# Patient Record
Sex: MALE | Race: WHITE | Employment: FULL TIME | URBAN - NONMETROPOLITAN AREA
[De-identification: names, ages, dates, MRNs, and addresses within clinical notes are randomized per-mention and may not be internally consistent; named-entity substitution may affect disease eponyms.]

---

## 2017-01-11 ENCOUNTER — TELEPHONE (OUTPATIENT)
Dept: FAMILY MEDICINE CLINIC | Facility: CLINIC | Age: 52
End: 2017-01-11

## 2017-01-11 NOTE — TELEPHONE ENCOUNTER
MICHAELA HAS BEEN SICK WITH WHAT COULD BE THE [place] VIRUS, HE HAS BEEN OUT ALL WEEK SO FAR, POSSIBLY RETURNING THURSDAY, GETACHEW WAS WONDERING IF HE COULD GET A NOTE FOR TOMORROW IF HE STAYS HOME, WOULD  HAVE TO SEE HIM?     I'm assuming they are referring to

## 2017-01-12 ENCOUNTER — OFFICE VISIT (OUTPATIENT)
Dept: FAMILY MEDICINE CLINIC | Facility: CLINIC | Age: 52
End: 2017-01-12

## 2017-01-12 VITALS
BODY MASS INDEX: 32 KG/M2 | OXYGEN SATURATION: 96 % | TEMPERATURE: 98 F | SYSTOLIC BLOOD PRESSURE: 110 MMHG | DIASTOLIC BLOOD PRESSURE: 88 MMHG | HEART RATE: 88 BPM | WEIGHT: 246.13 LBS

## 2017-01-12 DIAGNOSIS — K52.9 GASTROENTERITIS: Primary | ICD-10-CM

## 2017-01-12 PROCEDURE — 99213 OFFICE O/P EST LOW 20 MIN: CPT | Performed by: FAMILY MEDICINE

## 2017-01-12 NOTE — TELEPHONE ENCOUNTER
Future Appointments  Date Time Provider Nadia Barroso   1/12/2017 11:30 AM Lory Steven DO [facility] [place]     Patient still has appointment for this morning

## 2017-01-12 NOTE — PROGRESS NOTES
Blessing Syed is a 45year old male. Patient presents with: Other: \"noro virus\" 01/08/17-01/03/17. .. Guadalupe Riedel needs note to go back to work. .... Guadalupe Riedel room 1      HPI:   Patient developed vomiting and diarrhea in the evening August 8.   Continued on the ninth and 10 week       Comment: socially       REVIEW OF SYSTEMS:   GENERAL HEALTH: feels well otherwise  SKIN: denies any unusual skin lesions or rashes  NEURO: denies headaches    EXAM:   /88 mmHg  Pulse 88  Temp(Src) 98 °F (36.7 °C) (Tympanic)  Wt 246 lb 2 oz

## 2017-01-13 RX ORDER — SIMVASTATIN 40 MG
TABLET ORAL
Qty: 30 TABLET | Refills: 0 | Status: SHIPPED | OUTPATIENT
Start: 2017-01-13 | End: 2017-01-20

## 2017-01-20 RX ORDER — SIMVASTATIN 40 MG
TABLET ORAL
Qty: 30 TABLET | Refills: 4 | Status: SHIPPED | OUTPATIENT
Start: 2017-01-20 | End: 2017-07-18

## 2017-01-28 ENCOUNTER — TELEPHONE (OUTPATIENT)
Dept: FAMILY MEDICINE CLINIC | Facility: CLINIC | Age: 52
End: 2017-01-28

## 2017-01-28 NOTE — TELEPHONE ENCOUNTER
Travis states that pt woke up around midnight c/o very sore throat and chest congestion. Jazmyn Stroud that there were no available appointments with a covering provider and recommended WIC.  Travis verbalized understanding of the information provided

## 2017-01-30 ENCOUNTER — OFFICE VISIT (OUTPATIENT)
Dept: FAMILY MEDICINE CLINIC | Facility: CLINIC | Age: 52
End: 2017-01-30

## 2017-01-30 VITALS
SYSTOLIC BLOOD PRESSURE: 136 MMHG | WEIGHT: 250.5 LBS | DIASTOLIC BLOOD PRESSURE: 84 MMHG | TEMPERATURE: 98 F | OXYGEN SATURATION: 96 % | HEART RATE: 97 BPM | BODY MASS INDEX: 33 KG/M2

## 2017-01-30 DIAGNOSIS — J01.90 ACUTE NON-RECURRENT SINUSITIS, UNSPECIFIED LOCATION: Primary | ICD-10-CM

## 2017-01-30 PROCEDURE — 99213 OFFICE O/P EST LOW 20 MIN: CPT | Performed by: FAMILY MEDICINE

## 2017-01-30 RX ORDER — AMOXICILLIN AND CLAVULANATE POTASSIUM 875; 125 MG/1; MG/1
1 TABLET, FILM COATED ORAL 2 TIMES DAILY
Qty: 20 TABLET | Refills: 0 | Status: SHIPPED | OUTPATIENT
Start: 2017-01-30 | End: 2018-02-01

## 2017-01-30 RX ORDER — PREDNISONE 20 MG/1
20 TABLET ORAL 2 TIMES DAILY
Qty: 14 TABLET | Refills: 0 | Status: SHIPPED | OUTPATIENT
Start: 2017-01-30 | End: 2017-02-06

## 2017-01-30 NOTE — PROGRESS NOTES
Shakila De La Rosa is a 47year old male. Patient presents with: Other: sinus issues--blood in mucus, ear pain, headache, sinus pain/pressure, cough, congestion, sore throat, fever. ...started on 1/26/17--fever and chills on 1/27/17. ...room 1      HPI:   Sin CARDIOVASCULAR: denies chest pain   GI: denies nausea, vomiting, diarrhea or abdominal pain   NEURO: denies headaches    EXAM:   /84 mmHg  Pulse 97  Temp(Src) 98.4 °F (36.9 °C) (Tympanic)  Wt 250 lb 8 oz  SpO2 96%  GENERAL: well developed, well nou Imaging & Consults:  None

## 2017-02-03 RX ORDER — CODEINE PHOSPHATE AND GUAIFENESIN 10; 100 MG/5ML; MG/5ML
SOLUTION ORAL
Qty: 120 ML | Refills: 0 | Status: SHIPPED | OUTPATIENT
Start: 2017-02-03 | End: 2019-03-16 | Stop reason: ALTCHOICE

## 2017-02-03 RX ORDER — ALBUTEROL SULFATE 90 UG/1
2 AEROSOL, METERED RESPIRATORY (INHALATION) EVERY 6 HOURS PRN
Qty: 1 INHALER | Refills: 0 | Status: SHIPPED | OUTPATIENT
Start: 2017-02-03 | End: 2019-03-16 | Stop reason: ALTCHOICE

## 2017-03-30 ENCOUNTER — MED REC SCAN ONLY (OUTPATIENT)
Dept: FAMILY MEDICINE CLINIC | Facility: CLINIC | Age: 52
End: 2017-03-30

## 2017-07-18 RX ORDER — SIMVASTATIN 40 MG
TABLET ORAL
Qty: 90 TABLET | Refills: 1 | Status: SHIPPED | OUTPATIENT
Start: 2017-07-18 | End: 2018-01-13

## 2017-07-18 NOTE — TELEPHONE ENCOUNTER
From: Too Jauregui  Sent: 7/18/2017 10:00 AM CDT  Subject: Medication Renewal Request    Last Huizar.  [facility] would like a refill of the following medications:  simvastatin 40 MG Oral Tab Alisha Vicente, ]    Preferred pharmacy: [address] -

## 2018-01-13 ENCOUNTER — NURSE ONLY (OUTPATIENT)
Dept: FAMILY MEDICINE CLINIC | Facility: CLINIC | Age: 53
End: 2018-01-13

## 2018-01-13 DIAGNOSIS — Z12.5 SCREENING FOR PROSTATE CANCER: Primary | ICD-10-CM

## 2018-01-13 DIAGNOSIS — E78.00 PURE HYPERCHOLESTEROLEMIA: ICD-10-CM

## 2018-01-13 LAB
ALBUMIN SERPL-MCNC: 4.1 G/DL (ref 3.5–4.8)
ALP LIVER SERPL-CCNC: 72 U/L (ref 45–117)
ALT SERPL-CCNC: 41 U/L (ref 17–63)
AST SERPL-CCNC: 23 U/L (ref 15–41)
BILIRUB SERPL-MCNC: 0.7 MG/DL (ref 0.1–2)
BUN BLD-MCNC: 17 MG/DL (ref 8–20)
CALCIUM BLD-MCNC: 9.4 MG/DL (ref 8.3–10.3)
CHLORIDE: 105 MMOL/L (ref 101–111)
CHOLEST SMN-MCNC: 202 MG/DL (ref ?–200)
CO2: 29 MMOL/L (ref 22–32)
COMPLEXED PSA SERPL-MCNC: 4.23 NG/ML (ref 0.01–4)
CREAT BLD-MCNC: 1.24 MG/DL (ref 0.7–1.3)
GLUCOSE BLD-MCNC: 89 MG/DL (ref 70–99)
HDLC SERPL-MCNC: 44 MG/DL (ref 45–?)
HDLC SERPL: 4.59 {RATIO} (ref ?–4.97)
LDLC SERPL CALC-MCNC: 112 MG/DL (ref ?–130)
M PROTEIN MFR SERPL ELPH: 7.6 G/DL (ref 6.1–8.3)
NONHDLC SERPL-MCNC: 158 MG/DL (ref ?–130)
POTASSIUM SERPL-SCNC: 4.4 MMOL/L (ref 3.6–5.1)
SODIUM SERPL-SCNC: 140 MMOL/L (ref 136–144)
TRIGL SERPL-MCNC: 229 MG/DL (ref ?–150)
VLDLC SERPL CALC-MCNC: 46 MG/DL (ref 5–40)

## 2018-01-13 PROCEDURE — 80061 LIPID PANEL: CPT | Performed by: FAMILY MEDICINE

## 2018-01-13 PROCEDURE — 84153 ASSAY OF PSA TOTAL: CPT | Performed by: FAMILY MEDICINE

## 2018-01-13 PROCEDURE — 36415 COLL VENOUS BLD VENIPUNCTURE: CPT | Performed by: FAMILY MEDICINE

## 2018-01-13 PROCEDURE — 80053 COMPREHEN METABOLIC PANEL: CPT | Performed by: FAMILY MEDICINE

## 2018-01-15 RX ORDER — SIMVASTATIN 40 MG
TABLET ORAL
Qty: 90 TABLET | Refills: 0 | Status: SHIPPED | OUTPATIENT
Start: 2018-01-15 | End: 2018-05-27

## 2018-01-15 NOTE — TELEPHONE ENCOUNTER
Last office visit: 1/30/2017  Last B/P taken 1/30/2017:  136/84  Last lipid and CMP: 1/13/2018  Last refill: 7/18/2017  Refill 1      Pending Prescriptions Disp Refills    SIMVASTATIN 40 MG Oral Tab [Pharmacy Med Name: SIMVASTATIN  40MG  TAB] 90 tablet      Sig: TAKE 1 TABLET BY MOUTH  NIGHTLY             No future appointments.

## 2018-02-01 ENCOUNTER — OFFICE VISIT (OUTPATIENT)
Dept: FAMILY MEDICINE CLINIC | Facility: CLINIC | Age: 53
End: 2018-02-01

## 2018-02-01 VITALS
TEMPERATURE: 99 F | BODY MASS INDEX: 29 KG/M2 | WEIGHT: 221 LBS | OXYGEN SATURATION: 99 % | SYSTOLIC BLOOD PRESSURE: 136 MMHG | HEART RATE: 88 BPM | DIASTOLIC BLOOD PRESSURE: 86 MMHG

## 2018-02-01 DIAGNOSIS — J32.9 SINUSITIS, UNSPECIFIED CHRONICITY, UNSPECIFIED LOCATION: Primary | ICD-10-CM

## 2018-02-01 DIAGNOSIS — J40 BRONCHITIS: ICD-10-CM

## 2018-02-01 PROCEDURE — 99213 OFFICE O/P EST LOW 20 MIN: CPT | Performed by: FAMILY MEDICINE

## 2018-02-01 RX ORDER — AMOXICILLIN AND CLAVULANATE POTASSIUM 875; 125 MG/1; MG/1
1 TABLET, FILM COATED ORAL 2 TIMES DAILY
Qty: 20 TABLET | Refills: 0 | Status: SHIPPED | OUTPATIENT
Start: 2018-02-01 | End: 2018-02-11

## 2018-02-01 RX ORDER — PREDNISONE 20 MG/1
20 TABLET ORAL 2 TIMES DAILY
Qty: 10 TABLET | Refills: 0 | Status: SHIPPED | OUTPATIENT
Start: 2018-02-01 | End: 2018-02-08

## 2018-02-01 NOTE — PATIENT INSTRUCTIONS
REST,FLUIDS,ADVIL / TYLENOL PRN fever / body aches  CALL NO CHANGE WORSENING  DISCUSSED WARNING SIGNS  F/U No change 2-3 days  alavert D  Humidifier / vicks

## 2018-02-01 NOTE — PROGRESS NOTES
HPI:    Patient ID: Kush Pizarro is a 46year old male. Head yessica x 3 wks  + cough occas - worse yest    HPI    Review of Systems   Constitutional: Positive for chills and fatigue. Negative for fever.    HENT: Positive for congestion, postnasal drip, rh reviewed.       /86   Pulse 88   Temp 98.5 °F (36.9 °C) (Temporal)   Wt 221 lb   SpO2 99%   BMI 28.76 kg/m²          ASSESSMENT/PLAN:   Sinusitis, unspecified chronicity, unspecified location  (primary encounter diagnosis)  Bronchitis    No orders of

## 2018-05-29 RX ORDER — SIMVASTATIN 40 MG
TABLET ORAL
Qty: 90 TABLET | Refills: 1 | Status: SHIPPED | OUTPATIENT
Start: 2018-05-29 | End: 2018-11-10

## 2018-11-10 ENCOUNTER — TELEPHONE (OUTPATIENT)
Dept: FAMILY MEDICINE CLINIC | Facility: CLINIC | Age: 53
End: 2018-11-10

## 2018-11-10 DIAGNOSIS — Z12.5 PROSTATE CANCER SCREENING: ICD-10-CM

## 2018-11-10 DIAGNOSIS — E78.5 HYPERLIPIDEMIA, UNSPECIFIED HYPERLIPIDEMIA TYPE: Primary | ICD-10-CM

## 2018-11-12 RX ORDER — SIMVASTATIN 40 MG
TABLET ORAL
Qty: 90 TABLET | Refills: 1 | Status: SHIPPED | OUTPATIENT
Start: 2018-11-12 | End: 2019-04-19

## 2018-11-12 NOTE — TELEPHONE ENCOUNTER
Last office visit 2-1-18  Last refill 5-29-18 #90 with 1  Labs done in January. Due for labs? What do you want to order?

## 2019-01-18 ENCOUNTER — TELEPHONE (OUTPATIENT)
Dept: FAMILY MEDICINE CLINIC | Facility: CLINIC | Age: 54
End: 2019-01-18

## 2019-01-18 NOTE — TELEPHONE ENCOUNTER
Left detailed message for pt to come at 830 am on 01/26/19 per Sj Patrick. I told him to call back if he had any questions.

## 2019-01-18 NOTE — TELEPHONE ENCOUNTER
PT CANNOT COME IN TO GET HIS FASTING LABS DONE DURING THE WEEK DUE TO WORK. CAN HE SQUEEZE IN ON Saturday, 1/26?  PLEASE CALL

## 2019-01-26 ENCOUNTER — NURSE ONLY (OUTPATIENT)
Dept: FAMILY MEDICINE CLINIC | Facility: CLINIC | Age: 54
End: 2019-01-26
Payer: COMMERCIAL

## 2019-01-26 DIAGNOSIS — E78.5 HYPERLIPIDEMIA, UNSPECIFIED HYPERLIPIDEMIA TYPE: ICD-10-CM

## 2019-01-26 DIAGNOSIS — Z12.5 PROSTATE CANCER SCREENING: ICD-10-CM

## 2019-01-26 LAB
ALBUMIN SERPL-MCNC: 4.2 G/DL (ref 3.1–4.5)
ALBUMIN/GLOB SERPL: 1.2 {RATIO} (ref 1–2)
ALP LIVER SERPL-CCNC: 69 U/L (ref 45–117)
ALT SERPL-CCNC: 41 U/L (ref 17–63)
ANION GAP SERPL CALC-SCNC: 6 MMOL/L (ref 0–18)
AST SERPL-CCNC: 24 U/L (ref 15–41)
BILIRUB SERPL-MCNC: 0.9 MG/DL (ref 0.1–2)
BUN BLD-MCNC: 20 MG/DL (ref 8–20)
BUN/CREAT SERPL: 16.4 (ref 10–20)
CALCIUM BLD-MCNC: 9.4 MG/DL (ref 8.3–10.3)
CHLORIDE SERPL-SCNC: 106 MMOL/L (ref 101–111)
CHOLEST SMN-MCNC: 179 MG/DL (ref ?–200)
CO2 SERPL-SCNC: 29 MMOL/L (ref 22–32)
COMPLEXED PSA SERPL-MCNC: 2.94 NG/ML (ref 0.01–4)
CREAT BLD-MCNC: 1.22 MG/DL (ref 0.7–1.3)
GLOBULIN PLAS-MCNC: 3.5 G/DL (ref 2.8–4.4)
GLUCOSE BLD-MCNC: 99 MG/DL (ref 70–99)
HDLC SERPL-MCNC: 47 MG/DL (ref 40–59)
LDLC SERPL CALC-MCNC: 104 MG/DL (ref ?–100)
M PROTEIN MFR SERPL ELPH: 7.7 G/DL (ref 6.4–8.2)
NONHDLC SERPL-MCNC: 132 MG/DL (ref ?–130)
OSMOLALITY SERPL CALC.SUM OF ELEC: 295 MOSM/KG (ref 275–295)
POTASSIUM SERPL-SCNC: 4.9 MMOL/L (ref 3.6–5.1)
SODIUM SERPL-SCNC: 141 MMOL/L (ref 136–144)
TRIGL SERPL-MCNC: 141 MG/DL (ref 30–149)
VLDLC SERPL CALC-MCNC: 28 MG/DL (ref 0–30)

## 2019-01-26 PROCEDURE — 36415 COLL VENOUS BLD VENIPUNCTURE: CPT | Performed by: FAMILY MEDICINE

## 2019-01-26 PROCEDURE — 80053 COMPREHEN METABOLIC PANEL: CPT | Performed by: FAMILY MEDICINE

## 2019-01-26 PROCEDURE — 80061 LIPID PANEL: CPT | Performed by: FAMILY MEDICINE

## 2019-01-26 PROCEDURE — 84153 ASSAY OF PSA TOTAL: CPT | Performed by: FAMILY MEDICINE

## 2019-01-28 DIAGNOSIS — E78.5 HYPERLIPIDEMIA, UNSPECIFIED HYPERLIPIDEMIA TYPE: Primary | ICD-10-CM

## 2019-01-28 DIAGNOSIS — Z12.5 SPECIAL SCREENING FOR MALIGNANT NEOPLASM OF PROSTATE: ICD-10-CM

## 2019-03-16 ENCOUNTER — OFFICE VISIT (OUTPATIENT)
Dept: FAMILY MEDICINE CLINIC | Facility: CLINIC | Age: 54
End: 2019-03-16
Payer: COMMERCIAL

## 2019-03-16 VITALS
HEIGHT: 73 IN | WEIGHT: 252.13 LBS | BODY MASS INDEX: 33.41 KG/M2 | DIASTOLIC BLOOD PRESSURE: 82 MMHG | HEART RATE: 78 BPM | SYSTOLIC BLOOD PRESSURE: 130 MMHG | TEMPERATURE: 98 F | OXYGEN SATURATION: 96 %

## 2019-03-16 DIAGNOSIS — E78.5 HYPERLIPIDEMIA, UNSPECIFIED HYPERLIPIDEMIA TYPE: ICD-10-CM

## 2019-03-16 DIAGNOSIS — Z00.00 PREVENTATIVE HEALTH CARE: Primary | ICD-10-CM

## 2019-03-16 PROCEDURE — 90471 IMMUNIZATION ADMIN: CPT | Performed by: FAMILY MEDICINE

## 2019-03-16 PROCEDURE — 90715 TDAP VACCINE 7 YRS/> IM: CPT | Performed by: FAMILY MEDICINE

## 2019-03-16 PROCEDURE — 99396 PREV VISIT EST AGE 40-64: CPT | Performed by: FAMILY MEDICINE

## 2019-03-16 RX ORDER — SILDENAFIL 100 MG/1
100 TABLET, FILM COATED ORAL AS NEEDED
Qty: 5 TABLET | Status: SHIPPED | OUTPATIENT
Start: 2019-03-16 | End: 2019-08-05

## 2019-03-16 NOTE — H&P
Swapnil Duncan is a 53year old male who presents for a complete physical exam.     Patient presents with:   Other: annual physical....room 1      HPI:   No complaints      Current Outpatient Medications on File Prior to Visit:  SIMVASTATIN 40 MG Oral Tab distress  SKIN: no rashes,no suspicious lesions  HEENT: atraumatic, normocephalic,ears and throat are clear  EYES:PERRLA, EOMI, conjunctiva are clear  NECK: supple,no adenopathy,no bruits, no thyromegaly  CHEST: no chest tenderness  LUNGS: clear to auscult • Alkaline Phosphatase 01/26/2019 69  45 - 117 U/L Final   • Bilirubin, Total 01/26/2019 0.9  0.1 - 2.0 mg/dL Final   • Total Protein 01/26/2019 7.7  6.4 - 8.2 g/dL Final   • Albumin 01/26/2019 4.2  3.1 - 4.5 g/dL Final   • Globulin  01/26/2019 3.5  2.8 03/16/2019        Meds & Refills for this Visit:  Requested Prescriptions     Signed Prescriptions Disp Refills   • Sildenafil Citrate (VIAGRA) 100 MG Oral Tab 5 tablet prn     Sig: Take 1 tablet (100 mg total) by mouth as needed for Erect

## 2019-04-20 RX ORDER — SIMVASTATIN 40 MG
TABLET ORAL
Qty: 90 TABLET | Refills: 1 | Status: SHIPPED | OUTPATIENT
Start: 2019-04-20 | End: 2019-09-07

## 2019-05-31 RX ORDER — SILDENAFIL 100 MG/1
100 TABLET, FILM COATED ORAL AS NEEDED
Qty: 5 TABLET | OUTPATIENT
Start: 2019-05-31

## 2019-08-05 RX ORDER — SILDENAFIL 100 MG/1
100 TABLET, FILM COATED ORAL AS NEEDED
Qty: 5 TABLET | Status: SHIPPED | OUTPATIENT
Start: 2019-08-05

## 2019-09-09 RX ORDER — SIMVASTATIN 40 MG
TABLET ORAL
Qty: 90 TABLET | Refills: 0 | Status: SHIPPED | OUTPATIENT
Start: 2019-09-09 | End: 2019-12-03

## 2019-09-09 NOTE — TELEPHONE ENCOUNTER
Last office visit 3-16-19  Last refill 4-20-19 #90 with 1  Labs due January. Due for follow up office visit. No future appointments. [phone] (home)   Left message for patient to call back.

## 2019-09-10 NOTE — TELEPHONE ENCOUNTER
WIFE CALLED BACK, I INFORMED HER PT. DUE FOR OFFICE VISIT BEFORE NEXT REFILL AND LABS WILL BE DUE IN JAN.

## 2019-11-05 ENCOUNTER — OFFICE VISIT (OUTPATIENT)
Dept: FAMILY MEDICINE CLINIC | Facility: CLINIC | Age: 54
End: 2019-11-05
Payer: COMMERCIAL

## 2019-11-05 VITALS
WEIGHT: 246 LBS | HEIGHT: 73 IN | BODY MASS INDEX: 32.6 KG/M2 | SYSTOLIC BLOOD PRESSURE: 130 MMHG | OXYGEN SATURATION: 98 % | HEART RATE: 94 BPM | DIASTOLIC BLOOD PRESSURE: 88 MMHG | TEMPERATURE: 98 F

## 2019-11-05 DIAGNOSIS — J01.40 ACUTE NON-RECURRENT PANSINUSITIS: Primary | ICD-10-CM

## 2019-11-05 PROCEDURE — 99213 OFFICE O/P EST LOW 20 MIN: CPT | Performed by: FAMILY MEDICINE

## 2019-11-05 RX ORDER — AMOXICILLIN AND CLAVULANATE POTASSIUM 875; 125 MG/1; MG/1
1 TABLET, FILM COATED ORAL 2 TIMES DAILY
Qty: 20 TABLET | Refills: 0 | Status: SHIPPED | OUTPATIENT
Start: 2019-11-05 | End: 2019-11-15

## 2019-11-05 NOTE — PATIENT INSTRUCTIONS
Push fluids, nasal saline ad deirda., may use Afrin or equivalent 12-hour nasal decongestant spray up to twice daily for no more than 3 days for severe congestion  May use over-the-counter expectorant such as Mucinex or equivalent  Augmentin 875 mg twice sergio

## 2019-11-05 NOTE — PROGRESS NOTES
HPI:   Toribio Welch is a 53year old male who presents for upper respiratory symptoms for  3  days.  Patient reports sore throat, yellow w/ blood colored nasal discharge, ear pain, sinus pain, OTC cold meds have not been helping, denies fever, denies cou feel plugged  LUNGS: no shortness of breath ,slight cough, scant sputum, no wheezing,no chest tightness  CARDIOVASCULAR: denies chest pain , palpatations  GI: no nausea or abdominal pain  NEURO: +sinus type headaches    EXAM:   /88   Pulse 94   Temp

## 2019-12-03 RX ORDER — SIMVASTATIN 40 MG
TABLET ORAL
Qty: 90 TABLET | Refills: 0 | Status: SHIPPED | OUTPATIENT
Start: 2019-12-03 | End: 2021-08-17

## 2019-12-03 NOTE — TELEPHONE ENCOUNTER
LOV 11-5-19 acute    LAST LAB 1-26-19 next due 1-26-20    LAST RX9=9-19 x 90 days    Next OV No future appointments.       PROTOCOL  Cholesterol Medication Protocol Atxvhb77/3 4:05 AM   ALT < 80    ALT resulted within past year    Lipid panel within past 12

## 2019-12-20 ENCOUNTER — TELEPHONE (OUTPATIENT)
Dept: FAMILY MEDICINE CLINIC | Facility: CLINIC | Age: 54
End: 2019-12-20

## 2019-12-20 NOTE — TELEPHONE ENCOUNTER
Cheyanne Romero from [address] would like a call if we do NOT get her fax that she is sending over. Bienvenido Melgar is needing to come in for an appt. For pre-op.

## 2019-12-24 ENCOUNTER — OFFICE VISIT (OUTPATIENT)
Dept: FAMILY MEDICINE CLINIC | Facility: CLINIC | Age: 54
End: 2019-12-24
Payer: COMMERCIAL

## 2019-12-24 VITALS
RESPIRATION RATE: 18 BRPM | WEIGHT: 243.5 LBS | HEIGHT: 72.5 IN | BODY MASS INDEX: 32.62 KG/M2 | TEMPERATURE: 98 F | HEART RATE: 78 BPM | SYSTOLIC BLOOD PRESSURE: 130 MMHG | OXYGEN SATURATION: 95 % | DIASTOLIC BLOOD PRESSURE: 82 MMHG

## 2019-12-24 DIAGNOSIS — M19.071 OSTEOARTHRITIS OF FIRST METATARSOPHALANGEAL (MTP) JOINT OF RIGHT FOOT: ICD-10-CM

## 2019-12-24 DIAGNOSIS — Z01.810 PRE-OPERATIVE CARDIOVASCULAR EXAMINATION: Primary | ICD-10-CM

## 2019-12-24 DIAGNOSIS — E78.5 HYPERLIPIDEMIA, UNSPECIFIED HYPERLIPIDEMIA TYPE: ICD-10-CM

## 2019-12-24 PROCEDURE — 99214 OFFICE O/P EST MOD 30 MIN: CPT | Performed by: FAMILY MEDICINE

## 2019-12-24 NOTE — H&P
Benjie Mcconnell is a 53year old male who presents for a pre-operative physical exam.     Patient presents with:  Pre-Op Exam: pre op for RT first metatarsophalangeal meiarthroplasty with Dr. Kevin Howard on 01/10/2020 at [facility] Number of children: 2      Years of education: Not on file      Highest education level: Not on file    Occupational History      Occupation: Liao        Employer: [employer]    Tobacco Use      Smoking status: Never Smoker      Smokeless tobacco: Never (mtp) joint of right foot  Hyperlipidemia, unspecified hyperlipidemia type      Patient is medically cleared for surgery. He will be undergoing a Cartiva hemiarthroplasty by Dr. Steve Low. No orders of the defined types were placed in this encounter.

## 2021-03-01 ENCOUNTER — LAB ENCOUNTER (OUTPATIENT)
Dept: LAB | Age: 56
End: 2021-03-01
Attending: FAMILY MEDICINE
Payer: COMMERCIAL

## 2021-03-01 ENCOUNTER — OFFICE VISIT (OUTPATIENT)
Dept: FAMILY MEDICINE CLINIC | Facility: CLINIC | Age: 56
End: 2021-03-01
Payer: COMMERCIAL

## 2021-03-01 VITALS
HEIGHT: 72.5 IN | RESPIRATION RATE: 16 BRPM | TEMPERATURE: 99 F | OXYGEN SATURATION: 99 % | WEIGHT: 251.25 LBS | SYSTOLIC BLOOD PRESSURE: 130 MMHG | BODY MASS INDEX: 33.66 KG/M2 | DIASTOLIC BLOOD PRESSURE: 78 MMHG | HEART RATE: 97 BPM

## 2021-03-01 DIAGNOSIS — Z12.5 PROSTATE CANCER SCREENING: ICD-10-CM

## 2021-03-01 DIAGNOSIS — M75.41 IMPINGEMENT SYNDROME OF RIGHT SHOULDER: ICD-10-CM

## 2021-03-01 DIAGNOSIS — E78.5 HYPERLIPIDEMIA, UNSPECIFIED HYPERLIPIDEMIA TYPE: ICD-10-CM

## 2021-03-01 DIAGNOSIS — Z01.810 PRE-OPERATIVE CARDIOVASCULAR EXAMINATION: Primary | ICD-10-CM

## 2021-03-01 DIAGNOSIS — M75.21 BICEPS TENDINITIS OF RIGHT SHOULDER: ICD-10-CM

## 2021-03-01 DIAGNOSIS — M75.81 ROTATOR CUFF TENDONITIS, RIGHT: ICD-10-CM

## 2021-03-01 LAB
ALBUMIN SERPL-MCNC: 4.2 G/DL (ref 3.4–5)
ALBUMIN/GLOB SERPL: 1.4 {RATIO} (ref 1–2)
ALP LIVER SERPL-CCNC: 62 U/L
ALT SERPL-CCNC: 34 U/L
ANION GAP SERPL CALC-SCNC: 6 MMOL/L (ref 0–18)
AST SERPL-CCNC: 18 U/L (ref 15–37)
BILIRUB SERPL-MCNC: 0.4 MG/DL (ref 0.1–2)
BUN BLD-MCNC: 19 MG/DL (ref 7–18)
BUN/CREAT SERPL: 14.7 (ref 10–20)
CALCIUM BLD-MCNC: 9.1 MG/DL (ref 8.5–10.1)
CHLORIDE SERPL-SCNC: 109 MMOL/L (ref 98–112)
CHOLEST SMN-MCNC: 254 MG/DL (ref ?–200)
CO2 SERPL-SCNC: 26 MMOL/L (ref 21–32)
COMPLEXED PSA SERPL-MCNC: 4.8 NG/ML (ref ?–4)
CREAT BLD-MCNC: 1.29 MG/DL
GLOBULIN PLAS-MCNC: 3.1 G/DL (ref 2.8–4.4)
GLUCOSE BLD-MCNC: 88 MG/DL (ref 70–99)
HDLC SERPL-MCNC: 51 MG/DL (ref 40–59)
LDLC SERPL CALC-MCNC: 157 MG/DL (ref ?–100)
M PROTEIN MFR SERPL ELPH: 7.3 G/DL (ref 6.4–8.2)
NONHDLC SERPL-MCNC: 203 MG/DL (ref ?–130)
OSMOLALITY SERPL CALC.SUM OF ELEC: 294 MOSM/KG (ref 275–295)
POTASSIUM SERPL-SCNC: 4.1 MMOL/L (ref 3.5–5.1)
SODIUM SERPL-SCNC: 141 MMOL/L (ref 136–145)
TRIGL SERPL-MCNC: 230 MG/DL (ref 30–149)
VLDLC SERPL CALC-MCNC: 46 MG/DL (ref 0–30)

## 2021-03-01 PROCEDURE — 3078F DIAST BP <80 MM HG: CPT | Performed by: FAMILY MEDICINE

## 2021-03-01 PROCEDURE — 80061 LIPID PANEL: CPT | Performed by: FAMILY MEDICINE

## 2021-03-01 PROCEDURE — 80053 COMPREHEN METABOLIC PANEL: CPT | Performed by: FAMILY MEDICINE

## 2021-03-01 PROCEDURE — 3008F BODY MASS INDEX DOCD: CPT | Performed by: FAMILY MEDICINE

## 2021-03-01 PROCEDURE — 93000 ELECTROCARDIOGRAM COMPLETE: CPT | Performed by: FAMILY MEDICINE

## 2021-03-01 PROCEDURE — 84153 ASSAY OF PSA TOTAL: CPT | Performed by: FAMILY MEDICINE

## 2021-03-01 PROCEDURE — 36415 COLL VENOUS BLD VENIPUNCTURE: CPT | Performed by: FAMILY MEDICINE

## 2021-03-01 PROCEDURE — 3075F SYST BP GE 130 - 139MM HG: CPT | Performed by: FAMILY MEDICINE

## 2021-03-01 PROCEDURE — 99214 OFFICE O/P EST MOD 30 MIN: CPT | Performed by: FAMILY MEDICINE

## 2021-08-11 RX ORDER — SIMVASTATIN 40 MG
40 TABLET ORAL NIGHTLY
Qty: 10 TABLET | Refills: 0 | Status: CANCELLED | OUTPATIENT
Start: 2021-08-11

## 2021-08-11 NOTE — TELEPHONE ENCOUNTER
Patient has not had this filled since 2019 through [facility] here. LOV 03/01/2021    LAST LAB 03/01/2021    LAST RX  Simvastatin #90 R0 12/03/2019    Next OV No future appointments.     PROTOCOL  Cholesterol Medication Protocol Mfzbta1508/11/2021 02:04 PM

## 2021-08-11 NOTE — TELEPHONE ENCOUNTER
Simvastatin    Needs a 10 day supply to [facility] hold him over til the mail order arrives.        [facility]

## 2021-08-17 RX ORDER — SIMVASTATIN 40 MG
40 TABLET ORAL NIGHTLY
Qty: 14 TABLET | Refills: 0 | Status: SHIPPED | OUTPATIENT
Start: 2021-08-17 | End: 2021-08-31

## 2021-08-17 RX ORDER — SIMVASTATIN 40 MG
40 TABLET ORAL NIGHTLY
Qty: 30 TABLET | Refills: 0 | Status: SHIPPED | OUTPATIENT
Start: 2021-08-17 | End: 2021-10-20

## 2021-08-17 NOTE — TELEPHONE ENCOUNTER
Spoke to spouse. She states pt is still taking the medication, he's been using leftover tablets from previous prescriptions. Requesting temp supply to be called into [facility], and additional script to [facility].     Advised spouse he is overdue for repeat

## 2021-09-09 ENCOUNTER — LABORATORY ENCOUNTER (OUTPATIENT)
Dept: LAB | Age: 56
End: 2021-09-09
Attending: FAMILY MEDICINE
Payer: COMMERCIAL

## 2021-09-09 DIAGNOSIS — E78.5 HYPERLIPIDEMIA, UNSPECIFIED HYPERLIPIDEMIA TYPE: ICD-10-CM

## 2021-09-09 DIAGNOSIS — R97.20 ELEVATED PSA: ICD-10-CM

## 2021-09-09 LAB
CHOLEST SMN-MCNC: 208 MG/DL (ref ?–200)
HDLC SERPL-MCNC: 48 MG/DL (ref 40–59)
LDLC SERPL CALC-MCNC: 127 MG/DL (ref ?–100)
NONHDLC SERPL-MCNC: 160 MG/DL (ref ?–130)
PATIENT FASTING Y/N/NP: YES
PSA SERPL-MCNC: 6.28 NG/ML (ref ?–4)
TRIGL SERPL-MCNC: 185 MG/DL (ref 30–149)
VLDLC SERPL CALC-MCNC: 33 MG/DL (ref 0–30)

## 2021-09-09 PROCEDURE — 84153 ASSAY OF PSA TOTAL: CPT | Performed by: FAMILY MEDICINE

## 2021-09-09 PROCEDURE — 80061 LIPID PANEL: CPT | Performed by: FAMILY MEDICINE

## 2021-09-20 ENCOUNTER — TELEPHONE (OUTPATIENT)
Dept: FAMILY MEDICINE CLINIC | Facility: CLINIC | Age: 56
End: 2021-09-20

## 2021-09-20 DIAGNOSIS — R97.20 ELEVATED PSA: Primary | ICD-10-CM

## 2021-09-20 NOTE — TELEPHONE ENCOUNTER
Referred to urologist for elevated PSA. Cannot get in to see Dr. Son Staton until November. Is it okay to wait until then? Should he see a different provider? Please advise.

## 2021-09-20 NOTE — TELEPHONE ENCOUNTER
DR MANRIQUEZ SAID MARILOU NEEDS TO SEE DR MORA (UROLOGIST) BUT HE DOESN'T HAVE ANYTHING OPEN UNTIL THE END OF NOVEMBER, IS THERE SOMEBODY ELSE HE CAN SEE?

## 2021-09-20 NOTE — TELEPHONE ENCOUNTER
I would rather Angela Kwan does not wait. Not because I am worried about the number but because I do want him to worry about it any longer than he has to.   Recommend Dr. Sherly De Luna

## 2021-09-20 NOTE — TELEPHONE ENCOUNTER
Soonest available w/Dr. Iris Gao is also in November. Dr. Andre Dillon opening is on October 21st.    SAPPHIRE Johnson has openings this week (9/22/21). Please advise who you would like him to see.

## 2021-10-19 ENCOUNTER — OFFICE VISIT (OUTPATIENT)
Dept: SURGERY | Facility: CLINIC | Age: 56
End: 2021-10-19
Payer: COMMERCIAL

## 2021-10-19 DIAGNOSIS — N40.0 ENLARGED PROSTATE: ICD-10-CM

## 2021-10-19 DIAGNOSIS — R97.20 ELEVATED PSA: ICD-10-CM

## 2021-10-19 DIAGNOSIS — R82.90 URINE FINDING: Primary | ICD-10-CM

## 2021-10-19 PROCEDURE — 81003 URINALYSIS AUTO W/O SCOPE: CPT | Performed by: UROLOGY

## 2021-10-19 PROCEDURE — 99243 OFF/OP CNSLTJ NEW/EST LOW 30: CPT | Performed by: UROLOGY

## 2021-10-19 NOTE — PROGRESS NOTES
Rooming Clinician:     Coco Castillo is a 61year old male.   Patient presents with:  Consult: elevated psa  Elevated PSA:  Current PSA: 6.28  PSA History:   Component      Latest Ref Rng & Units 9/9/2021 3/1/2021 1/26/2019   PSA Screen      <=4.00 ng/mL History:  Social History    Tobacco Use      Smoking status: Never Smoker      Smokeless tobacco: Never Used      Tobacco comment: Non-smoker    Alcohol use:  Yes      Alcohol/week: 0.0 standard drinks      Comment: socially    Drug use: No       REVIEW OF adjunctive blood or urine tests and/or MRI will be recommended. We discussed the normal ranges of PSA for an adult male and discussed the reasons why PSA could be elevated which include enlargement of the prostate, urinary infection, or prostate cancer.

## 2021-10-19 NOTE — PATIENT INSTRUCTIONS
Prostate-Specific Antigen (PSA)  Does this test have other names? PSA  What is this test?  This test measures the level of prostate-specific antigen (PSA) in your blood. The cells of the prostate gland make the protein called PSA.  Men normally have lo mean?  Test results may vary depending on your age, gender, health history, the method used for the test, and other things. Your test results may not mean you have a problem. Ask your healthcare provider what your test results mean for you.    Results are g healthcare professional's instructions. Prostate Ultrasound  An ultrasound is a type of imaging test. It’s a safe test that does not use radiation. It uses high-frequency sound waves to make images of the inside of your body.  The images are seen on or with your feet in stirrups.   · A disposable cover is put on the ultrasound probe. The probe is tube-shaped and a bit larger than a thumb. A jelly is put on the probe to grease (lubricate) it.   · Your healthcare provider gently puts the probe into your hyperplasia or BPH)  · Prostate biopsy  · Prostate cancer  · Prostate infection (prostatitis)  · Prostate massage  · Recent ejaculation  Why a PSA test is done  A PSA test can be done to check for prostate cancer.  But this test by itself can’t tell for noé for cancer  · Your symptoms  · Past PSA test results  All of these things are taken into account when your PSA tests numbers are checked. [place] last reviewed this educational content on 8/1/2019  © 0008-1768 [employer].  All rights reser

## 2021-10-20 RX ORDER — SIMVASTATIN 40 MG
TABLET ORAL
Qty: 30 TABLET | Refills: 2 | Status: SHIPPED | OUTPATIENT
Start: 2021-10-20 | End: 2022-02-07

## 2021-10-20 NOTE — TELEPHONE ENCOUNTER
Cholesterol Medication Protocol Passed 10/20/2021 10:10 AM   Protocol Details  ALT < 80    ALT resulted within past year    Lipid panel within past 12 months    Appointment within past 12 or next 3 months        Last office visit:  03/01/21  Last refill:

## 2021-11-12 ENCOUNTER — LAB ENCOUNTER (OUTPATIENT)
Dept: LAB | Age: 56
End: 2021-11-12
Attending: FAMILY MEDICINE
Payer: COMMERCIAL

## 2021-11-15 DIAGNOSIS — R97.20 ELEVATED PSA, LESS THAN 10 NG/ML: Primary | ICD-10-CM

## 2021-11-19 ENCOUNTER — TELEPHONE (OUTPATIENT)
Dept: SURGERY | Facility: CLINIC | Age: 56
End: 2021-11-19

## 2021-11-19 NOTE — TELEPHONE ENCOUNTER
----- Message from Olesya Pace MD sent at 11/15/2021  9:11 AM CST -----  Your recent PSA is abnormal.  Repeat PSA decreased slightly to 5.65 ng/mL. Free PSA is 15% which is indeterminate.   Because of your young age I would recommend a 4K score which

## 2021-12-13 ENCOUNTER — PATIENT MESSAGE (OUTPATIENT)
Dept: SURGERY | Facility: CLINIC | Age: 56
End: 2021-12-13

## 2021-12-13 NOTE — TELEPHONE ENCOUNTER
From: Blessing Syed  Sent: 12/13/2021 10:57 AM CST  To: [facility] Clinical Staff  Subject: PSA result    I am scheduled for my MRI tomorrow, can I stop by office to get 4k bloodwork kit while I'm at [facility]?  Thanks

## 2021-12-14 ENCOUNTER — HOSPITAL ENCOUNTER (OUTPATIENT)
Dept: MRI IMAGING | Facility: HOSPITAL | Age: 56
Discharge: HOME | End: 2021-12-14
Attending: UROLOGY
Payer: COMMERCIAL

## 2021-12-14 ENCOUNTER — LAB ENCOUNTER (OUTPATIENT)
Dept: LAB | Age: 56
End: 2021-12-14
Attending: UROLOGY
Payer: COMMERCIAL

## 2021-12-14 DIAGNOSIS — R97.20 ELEVATED PSA: Primary | ICD-10-CM

## 2021-12-14 PROCEDURE — A9575 INJ GADOTERATE MEGLUMI 0.1ML: HCPCS | Performed by: UROLOGY

## 2021-12-14 PROCEDURE — 72197 MRI PELVIS W/O & W/DYE: CPT | Performed by: UROLOGY

## 2021-12-14 PROCEDURE — 36415 COLL VENOUS BLD VENIPUNCTURE: CPT

## 2021-12-15 NOTE — PROGRESS NOTES
Your recent prostate MRI shows volume of 63 cc. Normal is 15-20. PSA density with amount of PSA divided by the volume is 0.9 which is within normal limits. Free PSA was 15% as we discussed is indeterminate.   The MRI itself did not demonstrate any high r

## 2021-12-17 ENCOUNTER — TELEPHONE (OUTPATIENT)
Dept: SURGERY | Facility: CLINIC | Age: 56
End: 2021-12-17

## 2021-12-20 ENCOUNTER — TELEPHONE (OUTPATIENT)
Dept: SURGERY | Facility: CLINIC | Age: 56
End: 2021-12-20

## 2021-12-20 NOTE — PROGRESS NOTES
Your recent 4K score is 5% with is normal.  Suggest only 5% risk of having high-grade prostate cancer, 95% probability that you would not. PSA density, PSA divided by volume of prostate is 0.08 which is normal, should be less than 0.15.   Prostate MRI show

## 2022-02-07 ENCOUNTER — OFFICE VISIT (OUTPATIENT)
Dept: FAMILY MEDICINE CLINIC | Facility: CLINIC | Age: 57
End: 2022-02-07
Payer: COMMERCIAL

## 2022-02-07 VITALS
SYSTOLIC BLOOD PRESSURE: 138 MMHG | DIASTOLIC BLOOD PRESSURE: 82 MMHG | OXYGEN SATURATION: 97 % | WEIGHT: 260 LBS | TEMPERATURE: 98 F | HEART RATE: 85 BPM | BODY MASS INDEX: 35 KG/M2

## 2022-02-07 DIAGNOSIS — J01.90 ACUTE RHINOSINUSITIS: Primary | ICD-10-CM

## 2022-02-07 PROCEDURE — 3079F DIAST BP 80-89 MM HG: CPT | Performed by: FAMILY MEDICINE

## 2022-02-07 PROCEDURE — 99213 OFFICE O/P EST LOW 20 MIN: CPT | Performed by: FAMILY MEDICINE

## 2022-02-07 PROCEDURE — 3075F SYST BP GE 130 - 139MM HG: CPT | Performed by: FAMILY MEDICINE

## 2022-02-07 RX ORDER — AMOXICILLIN AND CLAVULANATE POTASSIUM 875; 125 MG/1; MG/1
1 TABLET, FILM COATED ORAL 2 TIMES DAILY
Qty: 20 TABLET | Refills: 1 | Status: SHIPPED | OUTPATIENT
Start: 2022-02-07 | End: 2022-02-27

## 2022-02-07 RX ORDER — SIMVASTATIN 40 MG
40 TABLET ORAL NIGHTLY
Qty: 30 TABLET | Refills: 0 | Status: SHIPPED | OUTPATIENT
Start: 2022-02-07

## 2022-02-07 RX ORDER — SIMVASTATIN 40 MG
TABLET ORAL
Qty: 90 TABLET | Refills: 0 | Status: SHIPPED | OUTPATIENT
Start: 2022-02-07

## 2022-02-07 NOTE — TELEPHONE ENCOUNTER
[facility] IS REQUESTING A MONTH FILL OF SIMVASTATIN 40 MG Oral Tab  30 DAY SUPPLY [facility] AND 90 DAY SUPPLY TO [facility]. PT. WILL RUN OUT BEFORE THEY CAN FILL THIS.

## 2022-02-08 RX ORDER — SIMVASTATIN 40 MG
TABLET ORAL
Qty: 90 TABLET | Refills: 0 | OUTPATIENT
Start: 2022-02-08

## 2022-02-08 NOTE — TELEPHONE ENCOUNTER
Cholesterol Medication Protocol Passed 02/08/2022 09:44 AM   Protocol Details  ALT < 80    ALT resulted within past year    Lipid panel within past 12 months    Appointment within past 12 or next 3 months        Last office visit:  03/01/21  Last lipid:  09/09/21  Last refill:  02/07/22  #90, no refills      No future appointments.

## 2022-03-01 ENCOUNTER — TELEPHONE (OUTPATIENT)
Dept: FAMILY MEDICINE CLINIC | Facility: CLINIC | Age: 57
End: 2022-03-01

## 2022-03-01 NOTE — TELEPHONE ENCOUNTER
Wife states pt needs order for colonoscopy sent to Dr Kassidy Rodriguez at [address] [place] [address].   fax# [phone]

## 2022-04-15 RX ORDER — SIMVASTATIN 40 MG
TABLET ORAL
Qty: 90 TABLET | Refills: 0 | Status: SHIPPED | OUTPATIENT
Start: 2022-04-15

## 2022-10-11 RX ORDER — SIMVASTATIN 40 MG
40 TABLET ORAL NIGHTLY
Qty: 90 TABLET | Refills: 0 | Status: SHIPPED | OUTPATIENT
Start: 2022-10-11

## 2022-10-11 NOTE — TELEPHONE ENCOUNTER
Last OV: 3/1/21   Last labs: 9/9/21    No future appointments. Pt due for OV and labs-- WILL CONTACT PT 10/12/22 TO MAKE APPT.

## 2022-10-13 ENCOUNTER — TELEPHONE (OUTPATIENT)
Dept: FAMILY MEDICINE CLINIC | Facility: CLINIC | Age: 57
End: 2022-10-13

## 2022-10-13 DIAGNOSIS — Z12.5 PROSTATE CANCER SCREENING: ICD-10-CM

## 2022-10-13 DIAGNOSIS — Z13.0 SCREENING FOR DEFICIENCY ANEMIA: ICD-10-CM

## 2022-10-13 DIAGNOSIS — E78.5 HYPERLIPIDEMIA, UNSPECIFIED HYPERLIPIDEMIA TYPE: Primary | ICD-10-CM

## 2022-10-13 NOTE — TELEPHONE ENCOUNTER
Pt would like labs ordered. He scheduled appt for physical with dr in December. He wanted to do labs before appt.

## 2022-10-13 NOTE — TELEPHONE ENCOUNTER
Orders placed and patient scheduled.   Future Appointments   Date Time Provider Nadia Barroso   11/26/2022  8:30 AM [facility] NURSE [facility] [place]   12/3/2022 11:20 AM Arnold Carson MD [facility] [place]

## 2022-10-13 NOTE — TELEPHONE ENCOUNTER
Please advise what labs you would like patient to have done.      Future Appointments   Date Time Provider Nadia Barroso   12/3/2022 11:20 AM Radha Carson MD [facility] [place]

## 2022-11-26 ENCOUNTER — NURSE ONLY (OUTPATIENT)
Dept: FAMILY MEDICINE CLINIC | Facility: CLINIC | Age: 57
End: 2022-11-26
Payer: COMMERCIAL

## 2022-11-26 DIAGNOSIS — Z13.0 SCREENING FOR DEFICIENCY ANEMIA: ICD-10-CM

## 2022-11-26 DIAGNOSIS — Z12.5 PROSTATE CANCER SCREENING: ICD-10-CM

## 2022-11-26 DIAGNOSIS — E78.5 HYPERLIPIDEMIA, UNSPECIFIED HYPERLIPIDEMIA TYPE: ICD-10-CM

## 2022-11-26 LAB
ALBUMIN SERPL-MCNC: 4.2 G/DL (ref 3.4–5)
ALBUMIN/GLOB SERPL: 1.4 {RATIO} (ref 1–2)
ALP LIVER SERPL-CCNC: 65 U/L
ALT SERPL-CCNC: 43 U/L
ANION GAP SERPL CALC-SCNC: 6 MMOL/L (ref 0–18)
AST SERPL-CCNC: 21 U/L (ref 15–37)
BASOPHILS # BLD AUTO: 0.05 X10(3) UL (ref 0–0.2)
BASOPHILS NFR BLD AUTO: 0.7 %
BILIRUB SERPL-MCNC: 0.7 MG/DL (ref 0.1–2)
BUN BLD-MCNC: 15 MG/DL (ref 7–18)
CALCIUM BLD-MCNC: 10 MG/DL (ref 8.5–10.1)
CHLORIDE SERPL-SCNC: 106 MMOL/L (ref 98–112)
CHOLEST SERPL-MCNC: 246 MG/DL (ref ?–200)
CO2 SERPL-SCNC: 28 MMOL/L (ref 21–32)
COMPLEXED PSA SERPL-MCNC: 6.14 NG/ML (ref ?–4)
CREAT BLD-MCNC: 1.19 MG/DL
EOSINOPHIL # BLD AUTO: 0.11 X10(3) UL (ref 0–0.7)
EOSINOPHIL NFR BLD AUTO: 1.5 %
ERYTHROCYTE [DISTWIDTH] IN BLOOD BY AUTOMATED COUNT: 12.1 %
FASTING PATIENT LIPID ANSWER: YES
FASTING STATUS PATIENT QL REPORTED: YES
GFR SERPLBLD BASED ON 1.73 SQ M-ARVRAT: 71 ML/MIN/1.73M2 (ref 60–?)
GLOBULIN PLAS-MCNC: 3 G/DL (ref 2.8–4.4)
GLUCOSE BLD-MCNC: 101 MG/DL (ref 70–99)
HCT VFR BLD AUTO: 49.5 %
HDLC SERPL-MCNC: 51 MG/DL (ref 40–59)
HGB BLD-MCNC: 16.4 G/DL
IMM GRANULOCYTES # BLD AUTO: 0.03 X10(3) UL (ref 0–1)
IMM GRANULOCYTES NFR BLD: 0.4 %
LDLC SERPL CALC-MCNC: 155 MG/DL (ref ?–100)
LYMPHOCYTES # BLD AUTO: 2.35 X10(3) UL (ref 1–4)
LYMPHOCYTES NFR BLD AUTO: 32.6 %
MCH RBC QN AUTO: 31.2 PG (ref 26–34)
MCHC RBC AUTO-ENTMCNC: 33.1 G/DL (ref 31–37)
MCV RBC AUTO: 94.1 FL
MONOCYTES # BLD AUTO: 0.61 X10(3) UL (ref 0.1–1)
MONOCYTES NFR BLD AUTO: 8.5 %
NEUTROPHILS # BLD AUTO: 4.06 X10 (3) UL (ref 1.5–7.7)
NEUTROPHILS # BLD AUTO: 4.06 X10(3) UL (ref 1.5–7.7)
NEUTROPHILS NFR BLD AUTO: 56.3 %
NONHDLC SERPL-MCNC: 195 MG/DL (ref ?–130)
OSMOLALITY SERPL CALC.SUM OF ELEC: 291 MOSM/KG (ref 275–295)
PLATELET # BLD AUTO: 198 10(3)UL (ref 150–450)
POTASSIUM SERPL-SCNC: 4.5 MMOL/L (ref 3.5–5.1)
PROT SERPL-MCNC: 7.2 G/DL (ref 6.4–8.2)
RBC # BLD AUTO: 5.26 X10(6)UL
SODIUM SERPL-SCNC: 140 MMOL/L (ref 136–145)
TRIGL SERPL-MCNC: 217 MG/DL (ref 30–149)
VLDLC SERPL CALC-MCNC: 42 MG/DL (ref 0–30)
WBC # BLD AUTO: 7.2 X10(3) UL (ref 4–11)

## 2022-11-26 PROCEDURE — 80061 LIPID PANEL: CPT | Performed by: FAMILY MEDICINE

## 2022-11-26 PROCEDURE — 85025 COMPLETE CBC W/AUTO DIFF WBC: CPT | Performed by: FAMILY MEDICINE

## 2022-11-26 PROCEDURE — 80053 COMPREHEN METABOLIC PANEL: CPT | Performed by: FAMILY MEDICINE

## 2022-12-31 ENCOUNTER — OFFICE VISIT (OUTPATIENT)
Dept: FAMILY MEDICINE CLINIC | Facility: CLINIC | Age: 57
End: 2022-12-31
Payer: COMMERCIAL

## 2022-12-31 VITALS
SYSTOLIC BLOOD PRESSURE: 120 MMHG | OXYGEN SATURATION: 97 % | WEIGHT: 251.13 LBS | HEART RATE: 82 BPM | BODY MASS INDEX: 33.65 KG/M2 | HEIGHT: 72.5 IN | DIASTOLIC BLOOD PRESSURE: 80 MMHG | RESPIRATION RATE: 12 BRPM | TEMPERATURE: 97 F

## 2022-12-31 DIAGNOSIS — Z00.00 WELL ADULT EXAM: Primary | ICD-10-CM

## 2022-12-31 DIAGNOSIS — E66.9 OBESITY (BMI 30-39.9): ICD-10-CM

## 2022-12-31 DIAGNOSIS — E78.2 MIXED HYPERLIPIDEMIA: ICD-10-CM

## 2022-12-31 PROCEDURE — 3074F SYST BP LT 130 MM HG: CPT | Performed by: FAMILY MEDICINE

## 2022-12-31 PROCEDURE — 3008F BODY MASS INDEX DOCD: CPT | Performed by: FAMILY MEDICINE

## 2022-12-31 PROCEDURE — 3079F DIAST BP 80-89 MM HG: CPT | Performed by: FAMILY MEDICINE

## 2022-12-31 PROCEDURE — 99396 PREV VISIT EST AGE 40-64: CPT | Performed by: FAMILY MEDICINE

## 2022-12-31 RX ORDER — SIMVASTATIN 40 MG
40 TABLET ORAL NIGHTLY
Qty: 30 TABLET | Refills: 0 | Status: SHIPPED | OUTPATIENT
Start: 2022-12-31 | End: 2023-01-30

## 2022-12-31 RX ORDER — SIMVASTATIN 40 MG
40 TABLET ORAL NIGHTLY
Qty: 90 TABLET | Refills: 3 | Status: SHIPPED | OUTPATIENT
Start: 2022-12-31 | End: 2022-12-31

## 2023-01-03 ENCOUNTER — OFFICE VISIT (OUTPATIENT)
Dept: SURGERY | Facility: CLINIC | Age: 58
End: 2023-01-03
Payer: COMMERCIAL

## 2023-01-03 DIAGNOSIS — R97.20 ELEVATED PSA: Primary | ICD-10-CM

## 2023-01-03 PROCEDURE — 99214 OFFICE O/P EST MOD 30 MIN: CPT | Performed by: SURGERY

## 2023-07-07 ENCOUNTER — LABORATORY ENCOUNTER (OUTPATIENT)
Dept: LAB | Age: 58
End: 2023-07-07
Attending: Other
Payer: COMMERCIAL

## 2023-07-07 DIAGNOSIS — R97.20 ELEVATED PSA: Primary | ICD-10-CM

## 2023-07-13 ENCOUNTER — TELEPHONE (OUTPATIENT)
Dept: SURGERY | Facility: CLINIC | Age: 58
End: 2023-07-13

## 2023-07-21 ENCOUNTER — OFFICE VISIT (OUTPATIENT)
Dept: FAMILY MEDICINE CLINIC | Facility: CLINIC | Age: 58
End: 2023-07-21
Payer: COMMERCIAL

## 2023-07-21 VITALS
WEIGHT: 251.38 LBS | HEART RATE: 74 BPM | BODY MASS INDEX: 34 KG/M2 | OXYGEN SATURATION: 98 % | DIASTOLIC BLOOD PRESSURE: 88 MMHG | SYSTOLIC BLOOD PRESSURE: 148 MMHG | TEMPERATURE: 98 F | RESPIRATION RATE: 18 BRPM

## 2023-07-21 DIAGNOSIS — B34.9 SYSTEMIC VIRAL ILLNESS: Primary | ICD-10-CM

## 2023-07-21 PROCEDURE — 3079F DIAST BP 80-89 MM HG: CPT | Performed by: FAMILY MEDICINE

## 2023-07-21 PROCEDURE — 99214 OFFICE O/P EST MOD 30 MIN: CPT | Performed by: FAMILY MEDICINE

## 2023-07-21 PROCEDURE — 3077F SYST BP >= 140 MM HG: CPT | Performed by: FAMILY MEDICINE

## 2023-07-21 RX ORDER — PREDNISONE 20 MG/1
60 TABLET ORAL DAILY
Qty: 12 TABLET | Refills: 0 | COMMUNITY
Start: 2023-07-19 | End: 2023-07-23

## 2023-07-24 ENCOUNTER — TELEPHONE (OUTPATIENT)
Dept: SURGERY | Facility: CLINIC | Age: 58
End: 2023-07-24

## 2023-07-24 DIAGNOSIS — R97.20 ELEVATED PSA: Primary | ICD-10-CM

## 2023-07-24 NOTE — TELEPHONE ENCOUNTER
Elke Guerrero is a 61year old male with history of hyperlipidemia who was seen previously by Dr. Ana Russell for elevated PSA. On initial consultation his PSA was up to 6.28 on 9/9/2021. Repeat PSA was 5.65, so a prostate MRI was performed on 12/14/2021. Prostate MRI showed a 63 g prostate a PI-RADS 3 lesion in the left mid posterior peripheral zone. 4K score was 5%. He returns for follow-up after 1 year today. Most recent PSA is 6.14 on 11/26/2022. Called patient and discussed 4K results today. Based on our discussion of the numbers below, we will proceed with a prostate MRI.     4K Score 12.9 (intermediate risk category)  (Predicts a 12.9% chance of clinically significant prostate cancer on biopsy)    PSA 7.78    Free PSA 17%    Prior PSA Results:  Lab Results   Component Value Date    PSA 5.65 (H) 11/12/2021    PSA 6.28 (H) 09/09/2021    PSA 1.78 02/23/2013    PSA 1.78 02/23/2013    PSAS 6.14 (H) 11/26/2022    PSAS 4.80 (H) 03/01/2021    PSAS 2.94 01/26/2019    PSAS 4.23 (H) 01/13/2018    PSAS 2.43 01/02/2016    PSAS 2.07 04/26/2014

## 2023-08-01 ENCOUNTER — TELEPHONE (OUTPATIENT)
Dept: FAMILY MEDICINE CLINIC | Facility: CLINIC | Age: 58
End: 2023-08-01

## 2023-08-01 NOTE — TELEPHONE ENCOUNTER
Pt wife called her  was seen 7/21 and they are concerned because he is still exhausted just doing very minimal activity would like to speak with nurse.

## 2023-08-01 NOTE — TELEPHONE ENCOUNTER
Hydration and rest    Report new symptom  The labs were never out of range  But we can get new cbc  Sed rate  And   Bmp    It may just need time for healing and recuperation. I would think by 3d week in August miguelito would be an improvement    I have now seen rpeports of [place] virus being in the [facility] in office there had been no reported cases to that date.     We can test for that    Though the treatment is purely supportive only

## 2023-08-01 NOTE — TELEPHONE ENCOUNTER
Spoke with Siomara Tao (wife) for update on condition:    Patient did return to work 7-25-23 and then starting exhibiting brain fog at that time. Also having issues with lethargy and headache off/on.      Per wife no shortness of breath,fever, and back pain has resolved at this time, eating and drinking as usual.

## 2023-08-17 ENCOUNTER — HOSPITAL ENCOUNTER (OUTPATIENT)
Dept: MRI IMAGING | Facility: HOSPITAL | Age: 58
Discharge: HOME | End: 2023-08-17
Attending: SURGERY
Payer: COMMERCIAL

## 2023-08-17 DIAGNOSIS — R97.20 ELEVATED PSA: ICD-10-CM

## 2023-08-17 PROCEDURE — A9575 INJ GADOTERATE MEGLUMI 0.1ML: HCPCS | Performed by: SURGERY

## 2023-08-17 PROCEDURE — 72197 MRI PELVIS W/O & W/DYE: CPT | Performed by: SURGERY

## 2023-08-17 RX ORDER — GADOTERATE MEGLUMINE 376.9 MG/ML
20 INJECTION INTRAVENOUS
Status: COMPLETED | OUTPATIENT
Start: 2023-08-17 | End: 2023-08-17

## 2023-08-17 RX ADMIN — GADOTERATE MEGLUMINE 20 ML: 376.9 INJECTION INTRAVENOUS at 15:44:00

## 2023-08-24 DIAGNOSIS — R97.20 ELEVATED PSA: Primary | ICD-10-CM

## 2023-08-25 ENCOUNTER — TELEPHONE (OUTPATIENT)
Dept: SURGERY | Facility: CLINIC | Age: 58
End: 2023-08-25

## 2024-02-13 ENCOUNTER — TELEPHONE (OUTPATIENT)
Dept: FAMILY MEDICINE CLINIC | Facility: CLINIC | Age: 59
End: 2024-02-13

## 2024-02-13 DIAGNOSIS — Z12.5 SCREENING PSA (PROSTATE SPECIFIC ANTIGEN): ICD-10-CM

## 2024-02-13 DIAGNOSIS — E78.2 MIXED HYPERLIPIDEMIA: Primary | ICD-10-CM

## 2024-02-13 DIAGNOSIS — Z13.0 SCREENING FOR DEFICIENCY ANEMIA: ICD-10-CM

## 2024-02-13 DIAGNOSIS — Z23 NEED FOR HEPATITIS VACCINATION: ICD-10-CM

## 2024-02-13 NOTE — TELEPHONE ENCOUNTER
See intake message:     1) blood work order     2) immunizations needed traveling to [facility]    Upon review of chart last labs done 7-19-23 chem profile/CBC through ER                 Last lipids and PSA 11-26-22    Diagnostic PSA ordered in Epic by Tomas Childers MD    Please advise

## 2024-02-13 NOTE — TELEPHONE ENCOUNTER
Appears he needs  Hepatitis vaccine a and b = twinrix    Ordered  And    Typhoid    Which is an oral pill vaccine  And  I can send to pharmacy if he wishes this    Confirm pharmacy if he wants this     normal...

## 2024-02-13 NOTE — TELEPHONE ENCOUNTER
Pt scheduled px end of March.  He wanted to do labs first on a Saturday.      Pt will be traveling to [facility] in April.  He wanted to know what vaccines he might need & if he can get at his px.

## 2024-02-14 RX ORDER — SIMVASTATIN 40 MG
40 TABLET ORAL NIGHTLY
Qty: 30 TABLET | Refills: 0 | Status: SHIPPED | OUTPATIENT
Start: 2024-02-14

## 2024-02-14 RX ORDER — SIMVASTATIN 40 MG
40 TABLET ORAL NIGHTLY
Qty: 90 TABLET | Refills: 1 | Status: SHIPPED | OUTPATIENT
Start: 2024-02-14

## 2024-02-14 NOTE — TELEPHONE ENCOUNTER
Codie notified of provider comments/recommendations regarding immunization will start twinrix series at upcoming appointment    Would like typhoid sent to [facility]    Needs 90 day script of simvastatin to [facility]    LOV  7-1-23    LAST LAB  7/2023    LAST RX  9-27-23  #90    Next OV    Future Appointments   Date Time Provider Department Center   2/19/2024  4:15 PM REF [facility] REF [facility] Ref Lab [facility]   3/18/2024  4:20 PM Carlin Carson MD [facility] [place]

## 2024-02-14 NOTE — TELEPHONE ENCOUNTER
OV 07/21/23  LABS 11/2022    REFILL 12/2022 #30    Future Appointments   Date Time Provider Department Center   2/19/2024  4:15 PM REF [facility] Ref Lab [facility]   3/18/2024  4:20 PM Carlin Carson MD [facility] [place]       Cholesterol Medication Protocol Acclac9202/13/2024 11:56 AM   Protocol Details ALT < 80    ALT resulted within past year    Lipid panel within past 12 months    In person appointment or virtual visit in the past 12 mos or appointment in next 3 mos

## 2024-02-19 ENCOUNTER — LABORATORY ENCOUNTER (OUTPATIENT)
Dept: LAB | Age: 59
End: 2024-02-19
Attending: FAMILY MEDICINE
Payer: COMMERCIAL

## 2024-02-19 DIAGNOSIS — E78.2 MIXED HYPERLIPIDEMIA: ICD-10-CM

## 2024-02-19 DIAGNOSIS — Z12.5 SCREENING PSA (PROSTATE SPECIFIC ANTIGEN): ICD-10-CM

## 2024-02-19 DIAGNOSIS — Z13.0 SCREENING FOR DEFICIENCY ANEMIA: ICD-10-CM

## 2024-02-19 LAB
ALBUMIN SERPL-MCNC: 4.1 G/DL (ref 3.4–5)
ALBUMIN/GLOB SERPL: 1.4 {RATIO} (ref 1–2)
ALP LIVER SERPL-CCNC: 60 U/L
ALT SERPL-CCNC: 37 U/L
ANION GAP SERPL CALC-SCNC: 3 MMOL/L (ref 0–18)
AST SERPL-CCNC: 16 U/L (ref 15–37)
BASOPHILS # BLD AUTO: 0.05 X10(3) UL (ref 0–0.2)
BASOPHILS NFR BLD AUTO: 0.5 %
BILIRUB SERPL-MCNC: 1.1 MG/DL (ref 0.1–2)
BUN BLD-MCNC: 17 MG/DL (ref 9–23)
CALCIUM BLD-MCNC: 9.7 MG/DL (ref 8.5–10.1)
CHLORIDE SERPL-SCNC: 105 MMOL/L (ref 98–112)
CHOLEST SERPL-MCNC: 221 MG/DL (ref ?–200)
CO2 SERPL-SCNC: 29 MMOL/L (ref 21–32)
COMPLEXED PSA SERPL-MCNC: 7 NG/ML (ref ?–4)
CREAT BLD-MCNC: 1.38 MG/DL
EGFRCR SERPLBLD CKD-EPI 2021: 59 ML/MIN/1.73M2 (ref 60–?)
EOSINOPHIL # BLD AUTO: 0.11 X10(3) UL (ref 0–0.7)
EOSINOPHIL NFR BLD AUTO: 1.1 %
ERYTHROCYTE [DISTWIDTH] IN BLOOD BY AUTOMATED COUNT: 12.1 %
FASTING PATIENT LIPID ANSWER: YES
FASTING STATUS PATIENT QL REPORTED: YES
GLOBULIN PLAS-MCNC: 3 G/DL (ref 2.8–4.4)
GLUCOSE BLD-MCNC: 88 MG/DL (ref 70–99)
HCT VFR BLD AUTO: 44.6 %
HDLC SERPL-MCNC: 46 MG/DL (ref 40–59)
HGB BLD-MCNC: 15.4 G/DL
IMM GRANULOCYTES # BLD AUTO: 0.02 X10(3) UL (ref 0–1)
IMM GRANULOCYTES NFR BLD: 0.2 %
LDLC SERPL CALC-MCNC: 130 MG/DL (ref ?–100)
LYMPHOCYTES # BLD AUTO: 3.11 X10(3) UL (ref 1–4)
LYMPHOCYTES NFR BLD AUTO: 30.3 %
MCH RBC QN AUTO: 30.6 PG (ref 26–34)
MCHC RBC AUTO-ENTMCNC: 34.5 G/DL (ref 31–37)
MCV RBC AUTO: 88.5 FL
MONOCYTES # BLD AUTO: 0.7 X10(3) UL (ref 0.1–1)
MONOCYTES NFR BLD AUTO: 6.8 %
NEUTROPHILS # BLD AUTO: 6.28 X10 (3) UL (ref 1.5–7.7)
NEUTROPHILS # BLD AUTO: 6.28 X10(3) UL (ref 1.5–7.7)
NEUTROPHILS NFR BLD AUTO: 61.1 %
NONHDLC SERPL-MCNC: 175 MG/DL (ref ?–130)
OSMOLALITY SERPL CALC.SUM OF ELEC: 285 MOSM/KG (ref 275–295)
PLATELET # BLD AUTO: 208 10(3)UL (ref 150–450)
POTASSIUM SERPL-SCNC: 4.2 MMOL/L (ref 3.5–5.1)
PROT SERPL-MCNC: 7.1 G/DL (ref 6.4–8.2)
RBC # BLD AUTO: 5.04 X10(6)UL
SODIUM SERPL-SCNC: 137 MMOL/L (ref 136–145)
TRIGL SERPL-MCNC: 256 MG/DL (ref 30–149)
VLDLC SERPL CALC-MCNC: 47 MG/DL (ref 0–30)
WBC # BLD AUTO: 10.3 X10(3) UL (ref 4–11)

## 2024-02-19 PROCEDURE — 84153 ASSAY OF PSA TOTAL: CPT | Performed by: FAMILY MEDICINE

## 2024-02-19 PROCEDURE — 80053 COMPREHEN METABOLIC PANEL: CPT | Performed by: FAMILY MEDICINE

## 2024-02-19 PROCEDURE — 85025 COMPLETE CBC W/AUTO DIFF WBC: CPT | Performed by: FAMILY MEDICINE

## 2024-02-19 PROCEDURE — 80061 LIPID PANEL: CPT | Performed by: FAMILY MEDICINE

## 2024-02-22 ENCOUNTER — PATIENT MESSAGE (OUTPATIENT)
Dept: FAMILY MEDICINE CLINIC | Facility: CLINIC | Age: 59
End: 2024-02-22

## 2024-02-22 NOTE — TELEPHONE ENCOUNTER
From: Alexander Yan  To: Carlin Carson  Sent: 2/22/2024 8:43 AM CST  Subject: Bloodwork    Please forward to Dr Childers all results of current results. Thanks, Perfecto

## 2024-03-06 ENCOUNTER — TELEPHONE (OUTPATIENT)
Dept: SURGERY | Facility: CLINIC | Age: 59
End: 2024-03-06

## 2024-03-18 ENCOUNTER — OFFICE VISIT (OUTPATIENT)
Dept: FAMILY MEDICINE CLINIC | Facility: CLINIC | Age: 59
End: 2024-03-18
Payer: COMMERCIAL

## 2024-03-18 VITALS
TEMPERATURE: 98 F | HEART RATE: 88 BPM | DIASTOLIC BLOOD PRESSURE: 72 MMHG | OXYGEN SATURATION: 99 % | RESPIRATION RATE: 12 BRPM | BODY MASS INDEX: 33.63 KG/M2 | WEIGHT: 251 LBS | SYSTOLIC BLOOD PRESSURE: 138 MMHG | HEIGHT: 72.5 IN

## 2024-03-18 DIAGNOSIS — Z23 NEED FOR VACCINATION: ICD-10-CM

## 2024-03-18 DIAGNOSIS — E78.2 MIXED HYPERLIPIDEMIA: ICD-10-CM

## 2024-03-18 DIAGNOSIS — Z00.00 WELL ADULT EXAM: Primary | ICD-10-CM

## 2024-03-18 PROCEDURE — 3078F DIAST BP <80 MM HG: CPT | Performed by: FAMILY MEDICINE

## 2024-03-18 PROCEDURE — 3075F SYST BP GE 130 - 139MM HG: CPT | Performed by: FAMILY MEDICINE

## 2024-03-18 PROCEDURE — 3008F BODY MASS INDEX DOCD: CPT | Performed by: FAMILY MEDICINE

## 2024-03-18 PROCEDURE — 90636 HEP A/HEP B VACC ADULT IM: CPT | Performed by: FAMILY MEDICINE

## 2024-03-18 PROCEDURE — 90471 IMMUNIZATION ADMIN: CPT | Performed by: FAMILY MEDICINE

## 2024-03-18 PROCEDURE — 99396 PREV VISIT EST AGE 40-64: CPT | Performed by: FAMILY MEDICINE

## 2024-03-24 NOTE — PROGRESS NOTES
Alexander Yan is a 59 year old male.    CC:    Chief Complaint   Patient presents with    CPX       Subjective:     Chief Complaint Reviewed and Verified  No Further Nursing Notes to   Review  Tobacco Reviewed  Allergies Reviewed  Medications Reviewed    Problem List Reviewed  Medical History Reviewed  Surgical History   Reviewed  Family History Reviewed         Here for evaluation  Retires this month    Will be going [place] and needs immunization    Feels well  Recovered from prior prolonged illness    More energy    No known injury  Denies need  Cholesterol shows Good control. Long term heart-healthy diet and lifestyle discussed and encouraged to reduce risk of cardiovascular disease.  Cholesterol: 221, done on 2/19/2024.  HDL Cholesterol: 46, done on 2/19/2024.  TriGlycerides 256, done on 2/19/2024.  LDL Cholesterol: 130, done on 2/19/2024.   Cholesterol medications include simvastatin 40 MG Oral Tab [[id number]], simvastatin 40 MG Oral Tab [[id number]] (Long-Term Med).      History/Other:   Allergies:  Allergies   Allergen Reactions    Biaxin [Clarithromycin]     Morphine ITCHING      Current Meds:  has a current medication list which includes the following prescription(s): simvastatin, sildenafil citrate, and multiple vitamin.      History:  Past Medical History:   Diagnosis Date    Dupuytren's contracture of right hand     4th and 5th finger, Dr. Noel Olson    History of 2019 novel coronavirus disease (COVID-19)     December, 2020    Hyperlipidemia     Rupture of biceps tendon 2/17/09    right side, surgically repaird, Dr. Noel Olson, [facility]    Tubular adenoma of colon     colonoscopy 7/2014 Dr Thompson Lynn at [facility]      Past Surgical History:   Procedure Laterality Date    APPENDECTOMY  01/01/1976    Appendicitis    SHOULDER ARTHROSCOPY Right       Family History   Problem Relation Age of Onset    Hypertension Mother     Hypertension Father     Hypertension Sister      Pulmonary Disease Father         COPD    Other (ovarian cancer [Other]) Mother     Other (CHF [Other]) Father       Family Status   Relation Status    Mo  at age 58        Ovarian cancer    Fa  at age 87        CHF    Sis Alive    Sis Alive    Sis (Not Specified)      Social History     Socioeconomic History    Marital status:     Number of children: 2   Occupational History    Occupation: [employer]     Employer: [employer]   Tobacco Use    Smoking status: Never    Smokeless tobacco: Never    Tobacco comments:     Non-smoker   Vaping Use    Vaping Use: Never used   Substance and Sexual Activity    Alcohol use: Yes     Alcohol/week: 0.0 standard drinks of alcohol     Comment: socially    Drug use: No          Immunization History   Administered Date(s) Administered    HEP A/HEP B Combined 2024    Influenza 10/08/2009    TDAP 2007, 2019         Wt Readings from Last 6 Encounters:   24 251 lb (113.9 kg)   23 251 lb 6.4 oz (114 kg)   22 251 lb 2 oz (113.9 kg)   22 260 lb (117.9 kg)   21 251 lb 4 oz (114 kg)   19 243 lb 8 oz (110.5 kg)       BP Readings from Last 3 Encounters:   24 138/72   23 148/88   22 120/80     REVIEW OF SYSTEMS:   GENERAL: feels well otherwise  SKIN: denies any unusual skin lesions  EYES:denies blurred vision or double vision  HEENT: denies nasal congestion, sinus pain or ST  LUNGS: denies shortness of breath with exertion  CARDIOVASCULAR: denies chest pain on exertion  GI: denies abdominal pain,denies heartburn   : denies nocturia or changes in stream  MUSCULOSKELETAL: denies back pain  NEURO: denies headaches  PSYCHE: denies depression or anxiety    Objective:    EXAM:   Blood pressure 138/72, pulse 88, temperature 98.4 °F (36.9 °C), temperature source Temporal, resp. rate 12, height 6' 0.5\" (1.842 m), weight 251 lb (113.9 kg), SpO2 99%.  Body mass index is 33.57 kg/m².      Reviewed by   [place]    GENERAL: well developed, well nourished,in no apparent distress  SKIN: no rashes,no suspicious lesions  HEENT: atraumatic, normocephalic,ears and throat are clear  EYES:PERRLA, EOMI, normal optic disk,conjunctiva are clear  NECK: supple,no adenopathy,no bruits  CHEST: no chest tenderness  LUNGS: clear to auscultation  CARDIO: RRR without murmur  GI: good BS's,no masses, HSM or tenderness  : defer  RECTAL: defer         MUSCULOSKELETAL: back is not tender,FROM of the back  EXTREMITIES: no cyanosis, clubbing or edema  NEURO: Oriented times three,cranial nerves are intact,motor and sensory are grossly intact    Assessment & Plan:       ASSESSMENT:    1. Well adult exam (Primary)  2. Mixed hyperlipidemia  Overview:  Cholesterol Lowering Medications            simvastatin 40 MG Oral Tab    simvastatin 40 MG Oral Tab                      Meds & Refills for this Visit:  Requested Prescriptions      No prescriptions requested or ordered in this encounter

## 2024-04-29 ENCOUNTER — OFFICE VISIT (OUTPATIENT)
Dept: SURGERY | Facility: CLINIC | Age: 59
End: 2024-04-29
Payer: COMMERCIAL

## 2024-04-29 DIAGNOSIS — R97.20 ELEVATED PSA: Primary | ICD-10-CM

## 2024-04-29 LAB
APPEARANCE: CLEAR
BILIRUBIN: NEGATIVE
GLUCOSE (URINE DIPSTICK): NEGATIVE MG/DL
KETONES (URINE DIPSTICK): NEGATIVE MG/DL
LEUKOCYTES: NEGATIVE
MULTISTIX LOT#: NORMAL NUMERIC
NITRITE, URINE: NEGATIVE
OCCULT BLOOD: NEGATIVE
PH, URINE: 6.5 (ref 4.5–8)
PROTEIN (URINE DIPSTICK): NEGATIVE MG/DL
SPECIFIC GRAVITY: 1.01 (ref 1–1.03)
URINE-COLOR: YELLOW
UROBILINOGEN,SEMI-QN: 0.2 MG/DL (ref 0–1.9)

## 2024-04-29 PROCEDURE — 81003 URINALYSIS AUTO W/O SCOPE: CPT | Performed by: SURGERY

## 2024-04-29 PROCEDURE — 99213 OFFICE O/P EST LOW 20 MIN: CPT | Performed by: SURGERY

## 2024-04-29 NOTE — PROGRESS NOTES
Urology Clinic Note    Primary Care Provider:  Carlin Carson MD     Chief Complaint:   Elevated PSA    HPI:   Alexander Yan is a 59 year old male with history of hyperlipidemia who was seen previously by Dr. Cortez for elevated PSA.  On initial consultation his PSA was up to 6.28 on 9/9/2021.  Repeat PSA was 5.65, so a prostate MRI was performed on 12/14/2021.  Prostate MRI showed a 63 g prostate a PI-RADS 3 lesion in the left mid posterior peripheral zone.  4K score was 5%.  PSA 6.14 on 11/26/2022.  Repeat 4K score 7/2023 was 12.9% with associated PSA 7.78.  Prostate MRI 8/17/2023 showed volume 46 cc, PI-RADS 2 study with no suspicious lesions.    PSA on 2/19/2024 was 7.0.  This is down slightly from last PSA of 7.78.    He feels well today with no urologic or urinary complaints.    AUA symptom score is 1/1 with LUTS.    Urinalysis: Negative    PSA:  Lab Results   Component Value Date    PSA 5.65 (H) 11/12/2021    PSA 6.28 (H) 09/09/2021    PSA 1.78 02/23/2013    PSA 1.78 02/23/2013    PSAS 7.00 (H) 02/19/2024    PSAS 6.14 (H) 11/26/2022    PSAS 4.80 (H) 03/01/2021    PSAS 2.94 01/26/2019    PSAS 4.23 (H) 01/13/2018    PSAS 2.43 01/02/2016    PSAS 2.07 04/26/2014        History:     Past Medical History:    Dupuytren's contracture of right hand    4th and 5th finger, Dr. Noel Olson    History of 2019 novel coronavirus disease (COVID-19)    December, 2020    Hyperlipidemia    Rupture of biceps tendon    right side, surgically repaird, Dr. Noel Olson, [facility]    Tubular adenoma of colon    colonoscopy 7/2014 Dr Thompson Lynn at [facility]       Past Surgical History:   Procedure Laterality Date    Appendectomy  01/01/1976    Appendicitis    Shoulder arthroscopy Right        Family History   Problem Relation Age of Onset    Hypertension Mother     Hypertension Father     Hypertension Sister     Pulmonary Disease Father         COPD    Other (ovarian cancer [Other]) Mother     Other (CHF [Other]) Father         Social History     Socioeconomic History    Marital status:     Number of children: 2   Occupational History    Occupation: [employer]     Employer: [employer]   Tobacco Use    Smoking status: Never    Smokeless tobacco: Never    Tobacco comments:     Non-smoker   Vaping Use    Vaping status: Never Used   Substance and Sexual Activity    Alcohol use: Yes     Alcohol/week: 0.0 standard drinks of alcohol     Comment: socially    Drug use: No       Medications (Active prior to today's visit):  Current Outpatient Medications   Medication Sig Dispense Refill    simvastatin 40 MG Oral Tab Take 1 tablet (40 mg total) by mouth nightly. 30 tablet 0    Sildenafil Citrate (VIAGRA) 100 MG Oral Tab Take 1 tablet (100 mg total) by mouth as needed for Erectile Dysfunction (Do not take more than 1 tablet per 24 hours). 5 tablet prn    Multiple Vitamins-Minerals (MULTIVITAMIN OR) Take  by mouth daily.         Allergies:  Allergies   Allergen Reactions    Biaxin [Clarithromycin]     Morphine ITCHING       Review of Systems:   A comprehensive 10-point review of systems was completed.  Pertinent positives and negatives are noted in the the HPI.    Physical Exam:   CONSTITUTIONAL: Well developed, well nourished, in no acute distress  NEUROLOGIC: Alert and oriented  HEAD: Normocephalic, atraumatic  EYES: Sclera non-icteric  ENT: Hearing intact, moist mucous membranes  NECK: No obvious goiter or masses  RESPIRATORY: Normal respiratory effort  SKIN: No evident rashes  ABDOMEN: Soft, non-tender, non-distended  DIGITAL RECTAL EXAM: ~40 gram prostate, no nodules or tenderness    Assessment & Plan:   Alexander Yan is a 59 year old male with history of hyperlipidemia who was seen previously by Dr. Cortez for elevated PSA.  On initial consultation his PSA was up to 6.28 on 9/9/2021.  Repeat PSA was 5.65, so a prostate MRI was performed on 12/14/2021.  Prostate MRI showed a 63 g prostate a PI-RADS 3 lesion in the left mid posterior  peripheral zone.  4K score was 5%.  PSA 6.14 on 11/26/2022.  Repeat 4K score 7/2023 was 12.9% with associated PSA 7.78.  Prostate MRI 8/17/2023 showed volume 46 cc, PI-RADS 2 study with no suspicious lesions.    PSA on 2/19/2024 was 7.0.  This is down slightly from last PSA of 7.78.    He feels well today with no urologic or urinary complaints.    -Discussed options of repeat PSA or 4K in 6 months versus prostate biopsy, he elects for repeat 4K in 6 months  -Return to clinic in 5-6 months for repeat prostate exam and 4K score    In total, 20 minutes were spent on this patient encounter (including chart review, patient history, physical, and counseling, documentation, and communication).    Tomas Childers MD  Staff Urologist  [facility]  Office: [phone]

## 2024-05-06 ENCOUNTER — TELEPHONE (OUTPATIENT)
Dept: FAMILY MEDICINE CLINIC | Facility: CLINIC | Age: 59
End: 2024-05-06

## 2024-05-06 DIAGNOSIS — Z23 NEED FOR VACCINATION: Primary | ICD-10-CM

## 2024-05-06 NOTE — TELEPHONE ENCOUNTER
Chart reviewed first immunization give 3-18-24 per vaccine series continuation protocol order placed for second dose.    Spoke with Codie (wife, DENG on file) and nurse visit scheduled:  Future Appointments   Date Time Provider Department Center   5/13/2024 10:00 AM [facility] [place]   9/27/2024  9:15 AM Tomas Childers MD [id number] [facility]

## 2024-05-13 ENCOUNTER — NURSE ONLY (OUTPATIENT)
Dept: FAMILY MEDICINE CLINIC | Facility: CLINIC | Age: 59
End: 2024-05-13
Payer: COMMERCIAL

## 2024-05-13 PROCEDURE — 90471 IMMUNIZATION ADMIN: CPT | Performed by: FAMILY MEDICINE

## 2024-05-13 PROCEDURE — 90636 HEP A/HEP B VACC ADULT IM: CPT | Performed by: FAMILY MEDICINE

## 2024-05-13 NOTE — PROGRESS NOTES
Patient presents to office for Hepatitis A/Hepatitis B (twinrix) vaccine #2. First immunization given on 3-18-24.     Patient states there was not any issues with first immunization.    Verified two patient identifiers and Twinrix vaccine given, patient tolerated well and left office in stable condition.    Aware dose # 3 due September 2024.

## 2024-09-07 RX ORDER — SIMVASTATIN 40 MG
40 TABLET ORAL NIGHTLY
Qty: 90 TABLET | Refills: 1 | Status: SHIPPED | OUTPATIENT
Start: 2024-09-07

## 2024-09-07 NOTE — TELEPHONE ENCOUNTER
OV 03/18/24  LABS 02/19/24    REFILL 02/14/24 #90 +1 RF - also temp supply 02/14/24 #30 to local pharmacy    Future Appointments   Date Time Provider Department Center   9/27/2024  9:15 AM Tomas Childers MD [id number] [facility]

## 2024-09-27 ENCOUNTER — OFFICE VISIT (OUTPATIENT)
Dept: SURGERY | Facility: CLINIC | Age: 59
End: 2024-09-27
Payer: COMMERCIAL

## 2024-09-27 ENCOUNTER — LAB ENCOUNTER (OUTPATIENT)
Dept: LAB | Age: 59
End: 2024-09-27
Attending: SURGERY
Payer: COMMERCIAL

## 2024-09-27 DIAGNOSIS — R97.20 ELEVATED PSA: Primary | ICD-10-CM

## 2024-09-27 DIAGNOSIS — R82.90 URINE FINDING: ICD-10-CM

## 2024-09-27 DIAGNOSIS — R97.20 ELEVATED PROSTATE SPECIFIC ANTIGEN (PSA): Primary | ICD-10-CM

## 2024-09-27 LAB
APPEARANCE: CLEAR
BILIRUBIN: NEGATIVE
GLUCOSE (URINE DIPSTICK): NEGATIVE MG/DL
KETONES (URINE DIPSTICK): NEGATIVE MG/DL
LEUKOCYTES: NEGATIVE
MULTISTIX LOT#: NORMAL NUMERIC
NITRITE, URINE: NEGATIVE
OCCULT BLOOD: NEGATIVE
PH, URINE: 7 (ref 4.5–8)
PROTEIN (URINE DIPSTICK): NEGATIVE MG/DL
SPECIFIC GRAVITY: 1.01 (ref 1–1.03)
URINE-COLOR: YELLOW
UROBILINOGEN,SEMI-QN: 0.2 MG/DL (ref 0–1.9)

## 2024-09-27 PROCEDURE — 81003 URINALYSIS AUTO W/O SCOPE: CPT | Performed by: SURGERY

## 2024-09-27 PROCEDURE — 99214 OFFICE O/P EST MOD 30 MIN: CPT | Performed by: SURGERY

## 2024-09-27 PROCEDURE — 36415 COLL VENOUS BLD VENIPUNCTURE: CPT

## 2024-09-27 NOTE — PROGRESS NOTES
Urology Clinic Note    Primary Care Provider:  Carlin Carson MD     Chief Complaint:   Elevated PSA     HPI:   Alexander Yan is a 59 year old male with history of hyperlipidemia who was seen previously by Dr. Cortez for elevated PSA.  On initial consultation his PSA was up to 6.28 on 9/9/2021.  Repeat PSA was 5.65, so a prostate MRI was performed on 12/14/2021.  Prostate MRI showed a 63 g prostate a PI-RADS 3 lesion in the left mid posterior peripheral zone.  4K score was 5%.  PSA 6.14 on 11/26/2022.  Repeat 4K score 7/2023 was 12.9% with associated PSA 7.78.  Prostate MRI 8/17/2023 showed volume 46 cc, PI-RADS 2 study with no suspicious lesions.     PSA on 2/19/2024 was 7.0.  This is down slightly from last PSA of 7.78.  After discussion he elected for repeat 4K score in 6 months.     He feels well today with no urologic or urinary complaints.    Doing the [place] [employer] boat tour this afternoon.    AUA symptom score is 9/2 with LUTS.    Urinalysis: Negative    PSA:  Lab Results   Component Value Date    PSA 5.65 (H) 11/12/2021    PSA 6.28 (H) 09/09/2021    PSA 1.78 02/23/2013    PSA 1.78 02/23/2013    PSAS 7.00 (H) 02/19/2024    PSAS 6.14 (H) 11/26/2022    PSAS 4.80 (H) 03/01/2021    PSAS 2.94 01/26/2019    PSAS 4.23 (H) 01/13/2018    PSAS 2.43 01/02/2016    PSAS 2.07 04/26/2014        History:     Past Medical History:    Dupuytren's contracture of right hand    4th and 5th finger, Dr. Noel Olson    History of 2019 novel coronavirus disease (COVID-19)    December, 2020    Hyperlipidemia    Rupture of biceps tendon    right side, surgically repaird, Dr. Noel Olson, [facility]    Tubular adenoma of colon    colonoscopy 7/2014 Dr Thompson Lynn at [facility]       Past Surgical History:   Procedure Laterality Date    Appendectomy  01/01/1976    Appendicitis    Shoulder arthroscopy Right        Family History   Problem Relation Age of Onset    Hypertension Mother     Hypertension Father      Hypertension Sister     Pulmonary Disease Father         COPD    Other (ovarian cancer [Other]) Mother     Other (CHF [Other]) Father        Social History     Socioeconomic History    Marital status:     Number of children: 2   Occupational History    Occupation: [employer]     Employer: [employer]   Tobacco Use    Smoking status: Never    Smokeless tobacco: Never    Tobacco comments:     Non-smoker   Vaping Use    Vaping status: Never Used   Substance and Sexual Activity    Alcohol use: Yes     Alcohol/week: 0.0 standard drinks of alcohol     Comment: socially    Drug use: No       Medications (Active prior to today's visit):  Current Outpatient Medications   Medication Sig Dispense Refill    simvastatin 40 MG Oral Tab TAKE 1 TABLET BY MOUTH EVERY  NIGHT 90 tablet 1    Sildenafil Citrate (VIAGRA) 100 MG Oral Tab Take 1 tablet (100 mg total) by mouth as needed for Erectile Dysfunction (Do not take more than 1 tablet per 24 hours). 5 tablet prn    Multiple Vitamins-Minerals (MULTIVITAMIN OR) Take  by mouth daily.         Allergies:  Allergies   Allergen Reactions    Biaxin [Clarithromycin]     Morphine ITCHING       Review of Systems:   A comprehensive 10-point review of systems was completed.  Pertinent positives and negatives are noted in the the HPI.    Physical Exam:   CONSTITUTIONAL: Well developed, well nourished, in no acute distress  NEUROLOGIC: Alert and oriented  HEAD: Normocephalic, atraumatic  EYES: Sclera non-icteric  ENT: Hearing intact, moist mucous membranes  NECK: No obvious goiter or masses  RESPIRATORY: Normal respiratory effort  SKIN: No evident rashes  ABDOMEN: Soft, non-tender, non-distended  DIGITAL RECTAL EXAM: ~40 gram prostate, no nodules or tenderness    Assessment & Plan:   Alexander Yan is a 59 year old male with history of hyperlipidemia who was seen previously by Dr. Cortez for elevated PSA.  On initial consultation his PSA was up to 6.28 on 9/9/2021.  Repeat PSA was 5.65, so a  prostate MRI was performed on 12/14/2021.  Prostate MRI showed a 63 g prostate a PI-RADS 3 lesion in the left mid posterior peripheral zone.  4K score was 5%.  PSA 6.14 on 11/26/2022.  Repeat 4K score 7/2023 was 12.9% with associated PSA 7.78.  Prostate MRI 8/17/2023 showed volume 46 cc, PI-RADS 2 study with no suspicious lesions.     PSA on 2/19/2024 was 7.0.  This is down slightly from last PSA of 7.78.  After discussion he elected for repeat 4K score in 6 months.     He feels well today with no urologic or urinary complaints.    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    -4K score  -Discussed that if 4K score is any higher than prior would recommend prostate biopsy    In total, 30 minutes were spent on this patient encounter (including chart review, patient history, physical, and counseling, documentation, and communication).    Tomas Childers MD  Staff Urologist  [facility]  Office: [phone]

## 2024-10-01 ENCOUNTER — TELEPHONE (OUTPATIENT)
Dept: SURGERY | Facility: CLINIC | Age: 59
End: 2024-10-01

## 2024-10-01 NOTE — TELEPHONE ENCOUNTER
4K score 9/27/2024 was 8.1% (intermediate risk) with associated PSA 6.35.    Future Appointments   Date Time Provider Department Center   10/10/2024  8:15 AM Tomas Childers MD [id number] [facility]

## 2024-10-10 ENCOUNTER — TELEPHONE (OUTPATIENT)
Dept: FAMILY MEDICINE CLINIC | Facility: CLINIC | Age: 59
End: 2024-10-10

## 2024-10-10 ENCOUNTER — VIRTUAL PHONE E/M (OUTPATIENT)
Dept: SURGERY | Facility: CLINIC | Age: 59
End: 2024-10-10
Payer: COMMERCIAL

## 2024-10-10 DIAGNOSIS — R97.20 ELEVATED PSA: Primary | ICD-10-CM

## 2024-10-10 DIAGNOSIS — Z23 NEED FOR VACCINATION: Primary | ICD-10-CM

## 2024-10-10 PROCEDURE — 99441 PHONE E/M BY PHYS 5-10 MIN: CPT | Performed by: SURGERY

## 2024-10-10 NOTE — TELEPHONE ENCOUNTER
Chart reviewed first immunization given 3-18-24, second immunization given 5-13-24 per vaccine series continuation protocol order placed for third dose in series.     Spoke with Codie (wife, DENG on file) she will check with patient and call back to schedule nurse visit.

## 2024-10-10 NOTE — PROGRESS NOTES
Urology Clinic Note  Telemedicine Visit  Audio Only  The patient consented to performing this visit virtually.     Primary Care Provider:  Carlin Carson MD     Chief Complaint:   Elevated PSA     HPI:   Alexander Yan is a 59 year old male with history of hyperlipidemia who was seen previously by Dr. Cortez for elevated PSA.  On initial consultation his PSA was up to 6.28 on 9/9/2021.  Repeat PSA was 5.65, so a prostate MRI was performed on 12/14/2021.  Prostate MRI showed a 63 g prostate a PI-RADS 3 lesion in the left mid posterior peripheral zone.  4K score was 5%.  PSA 6.14 on 11/26/2022.  Repeat 4K score 7/2023 was 12.9% with associated PSA 7.78.  Prostate MRI 8/17/2023 showed volume 46 cc, PI-RADS 2 study with no suspicious lesions.     PSA on 2/19/2024 was 7.0.  This is down slightly from last PSA of 7.78.  After discussion he elected for repeat 4K score in 6 months.     4K score 9/27/2024 was 8.1% (intermediate risk) with associated PSA 6.35.     PSA:  Lab Results   Component Value Date    PSA 5.65 (H) 11/12/2021    PSA 6.28 (H) 09/09/2021    PSA 1.78 02/23/2013    PSA 1.78 02/23/2013    PSAS 7.00 (H) 02/19/2024    PSAS 6.14 (H) 11/26/2022    PSAS 4.80 (H) 03/01/2021    PSAS 2.94 01/26/2019    PSAS 4.23 (H) 01/13/2018    PSAS 2.43 01/02/2016    PSAS 2.07 04/26/2014        History:     Past Medical History:    Dupuytren's contracture of right hand    4th and 5th finger, Dr. Noel Olson    History of 2019 novel coronavirus disease (COVID-19)    December, 2020    Hyperlipidemia    Rupture of biceps tendon    right side, surgically repaird, Dr. Noel Olson, [facility]    Tubular adenoma of colon    colonoscopy 7/2014 Dr Thompson Lynn at [facility]       Past Surgical History:   Procedure Laterality Date    Appendectomy  01/01/1976    Appendicitis    Shoulder arthroscopy Right        Family History   Problem Relation Age of Onset    Hypertension Mother     Hypertension Father     Hypertension Sister      Pulmonary Disease Father         COPD    Other (ovarian cancer [Other]) Mother     Other (CHF [Other]) Father        Social History     Socioeconomic History    Marital status:     Number of children: 2   Occupational History    Occupation: [employer]     Employer: [employer]   Tobacco Use    Smoking status: Never    Smokeless tobacco: Never    Tobacco comments:     Non-smoker   Vaping Use    Vaping status: Never Used   Substance and Sexual Activity    Alcohol use: Yes     Alcohol/week: 0.0 standard drinks of alcohol     Comment: socially    Drug use: No       Medications (Active prior to today's visit):  Current Outpatient Medications   Medication Sig Dispense Refill    simvastatin 40 MG Oral Tab TAKE 1 TABLET BY MOUTH EVERY  NIGHT 90 tablet 1    Sildenafil Citrate (VIAGRA) 100 MG Oral Tab Take 1 tablet (100 mg total) by mouth as needed for Erectile Dysfunction (Do not take more than 1 tablet per 24 hours). 5 tablet prn    Multiple Vitamins-Minerals (MULTIVITAMIN OR) Take  by mouth daily.         Allergies:  Allergies[1]    Review of Systems:   A comprehensive 10-point review of systems was completed.  Pertinent positives and negatives are noted in the the HPI.    Physical Exam:   No physical exam performed during this telephone encounter.    Assessment & Plan:   Alexander Yan is a 59 year old male with history of hyperlipidemia who was seen previously by Dr. Cortez for elevated PSA.  On initial consultation his PSA was up to 6.28 on 9/9/2021.  Repeat PSA was 5.65, so a prostate MRI was performed on 12/14/2021.  Prostate MRI showed a 63 g prostate a PI-RADS 3 lesion in the left mid posterior peripheral zone.  4K score was 5%.  PSA 6.14 on 11/26/2022.  Repeat 4K score 7/2023 was 12.9% with associated PSA 7.78.  Prostate MRI 8/17/2023 showed volume 46 cc, PI-RADS 2 study with no suspicious lesions.     PSA on 2/19/2024 was 7.0.  This is down slightly from last PSA of 7.78.  After discussion he elected for repeat  4K score in 6 months.     4K score 9/27/2024 was 8.1% (intermediate risk) with associated PSA 6.35.     - PSA and followup in 6 months  - Will think about option of prostate biopsy and let us know if he would rather do this    In total, 5 minutes were spent on this patient encounter for medical discussion.    Tomas Childers MD  Staff Urologist  [facility]  Office: [phone]             [1]   Allergies  Allergen Reactions    Biaxin [Clarithromycin]     Morphine ITCHING

## 2024-10-19 ENCOUNTER — NURSE ONLY (OUTPATIENT)
Dept: FAMILY MEDICINE CLINIC | Facility: CLINIC | Age: 59
End: 2024-10-19
Payer: COMMERCIAL

## 2024-10-19 PROCEDURE — 90471 IMMUNIZATION ADMIN: CPT | Performed by: FAMILY MEDICINE

## 2024-10-19 PROCEDURE — 90636 HEP A/HEP B VACC ADULT IM: CPT | Performed by: FAMILY MEDICINE

## 2024-10-28 ENCOUNTER — PATIENT MESSAGE (OUTPATIENT)
Dept: FAMILY MEDICINE CLINIC | Facility: CLINIC | Age: 59
End: 2024-10-28

## 2024-10-28 DIAGNOSIS — Z13.6 SCREENING FOR CARDIOVASCULAR CONDITION: ICD-10-CM

## 2024-10-28 DIAGNOSIS — E78.2 MIXED HYPERLIPIDEMIA: Primary | ICD-10-CM

## 2024-12-13 ENCOUNTER — LABORATORY ENCOUNTER (OUTPATIENT)
Dept: LAB | Age: 59
End: 2024-12-13
Attending: FAMILY MEDICINE
Payer: COMMERCIAL

## 2024-12-13 DIAGNOSIS — Z13.6 SCREENING FOR CARDIOVASCULAR CONDITION: ICD-10-CM

## 2024-12-13 DIAGNOSIS — E78.2 MIXED HYPERLIPIDEMIA: ICD-10-CM

## 2024-12-13 LAB
CHOLEST SERPL-MCNC: 223 MG/DL (ref ?–200)
FASTING PATIENT LIPID ANSWER: YES
HDLC SERPL-MCNC: 51 MG/DL (ref 40–59)
LDLC SERPL CALC-MCNC: 142 MG/DL (ref ?–100)
NONHDLC SERPL-MCNC: 172 MG/DL (ref ?–130)
TRIGL SERPL-MCNC: 169 MG/DL (ref 30–149)
VLDLC SERPL CALC-MCNC: 32 MG/DL (ref 0–30)

## 2024-12-13 PROCEDURE — 80061 LIPID PANEL: CPT

## 2024-12-13 PROCEDURE — 36415 COLL VENOUS BLD VENIPUNCTURE: CPT

## 2024-12-13 PROCEDURE — 83695 ASSAY OF LIPOPROTEIN(A): CPT

## 2024-12-16 LAB — LIPOPROTEIN (A): 127.1 NMOL/L

## 2025-01-05 ENCOUNTER — ORDER TRANSCRIPTION (OUTPATIENT)
Dept: ADMINISTRATIVE | Facility: HOSPITAL | Age: 60
End: 2025-01-05

## 2025-01-05 DIAGNOSIS — Z13.6 SCREENING FOR CARDIOVASCULAR CONDITION: Primary | ICD-10-CM

## 2025-01-10 ENCOUNTER — HOSPITAL ENCOUNTER (OUTPATIENT)
Dept: CT IMAGING | Facility: HOSPITAL | Age: 60
Discharge: HOME | End: 2025-01-10
Attending: FAMILY MEDICINE

## 2025-01-10 DIAGNOSIS — Z13.6 SCREENING FOR CARDIOVASCULAR CONDITION: ICD-10-CM

## 2025-01-10 DIAGNOSIS — E78.2 MIXED HYPERLIPIDEMIA: ICD-10-CM

## 2025-01-10 NOTE — PROGRESS NOTES
Date of Service 1/10/2025    MICHAELA TALLEY  Date of Birth 1/27/1965    Patient Age: 59 year old    PCP: Carlin Carson MD  [address] [facility] [address]    Heart Scan Consult  Preliminary Heart Scan Score: 215    Previous Screening  Heart Scan Completed Previously: No                   Risk Factors  Personal Risk Factors  Alterable Risk Factors: Abnormal Cholesterol      Body Mass Index  BMI 33    Blood Pressure  /72 no medication.  (Normal =< 120/80,  Elevated = 120-129/ >80,  High Stage1 130-139/80-89 , Stage2 >140/>90)    Lipid Profile  Cholesterol: 223, done on 12/13/2024.  HDL Cholesterol: 51, done on 12/13/2024.  LDL Cholesterol: 142, done on 12/13/2024.  TriGlycerides 169, done on 12/13/2024.  He is on Simvastatin and has been for years, he is compliant.  Need to reevaluate statin.    Adjust diet.  Decrease the saturated fats in your diet to try and lower the LDL.  Saturated fats are:  red meat - beef, toney, sausage, dairy products - milk, cheese, butter, etc., egg yolks (the egg white has no cholesterol) fried food and fast food.  Increased fiber may help lower LDL - fruits, vegetables, oatmeal.  Can also try over the counter fiber products like Metamucil, Benefiber.    Cholesterol Goals  Value   Total  =< 200   HDL  = > 45 Men = > 55 Women   LDL   =< 100   Triglycerides  =< 150       Glucose and Hemoglobin A1C  Lab Results   Component Value Date    GLU 88 02/19/2024     (Normal Fasting Glucose < 100mg/dl )    Nurse Review  Risk factor information and results reviewed with Nurse: Yes    Recommended Follow Up:  Consult your physician regarding:: Final Heart Scan Report;Discuss potential for Incidental Finding      Recommendations for Change:  Nutrition Changes: Increase Fiber;Low Saturated Fat    Cholesterol Modification (goal of therapy depends upon your risk): Decrease LDL (Lousy/Bad) Ideal <100;Decrease Triglycerides (Ugly) Normal <150    Exercise: Enhance Current Program    Smoking  Cessation: No Change Needed    Weight Management: Decrease Current Weight    Stress Management: Adopt Stress Management Techniques    Repeat Heart Scan: 3 Years if Calcium Score is > 0.0;Discuss with your Physician              [place] Recommended Resources:  Recommended Resources: Upcoming Classes, Medical Services and Health Library [email or web address]            Hayley KU RN        Please Contact the Nurse Heart Line with any Questions or Concerns [phone].

## 2025-06-06 ENCOUNTER — LAB ENCOUNTER (OUTPATIENT)
Dept: LAB | Age: 60
End: 2025-06-06
Attending: FAMILY MEDICINE
Payer: COMMERCIAL

## 2025-06-06 DIAGNOSIS — R97.20 ELEVATED PSA: ICD-10-CM

## 2025-06-06 LAB — PSA SERPL-MCNC: 4.88 NG/ML (ref ?–4)

## 2025-06-06 PROCEDURE — 84153 ASSAY OF PSA TOTAL: CPT

## 2025-06-06 PROCEDURE — 36415 COLL VENOUS BLD VENIPUNCTURE: CPT

## 2025-07-09 ENCOUNTER — LABORATORY ENCOUNTER (OUTPATIENT)
Dept: LAB | Age: 60
End: 2025-07-09
Attending: FAMILY MEDICINE
Payer: COMMERCIAL

## 2025-07-09 DIAGNOSIS — E78.2 MIXED HYPERLIPIDEMIA: ICD-10-CM

## 2025-07-09 DIAGNOSIS — Z13.6 SCREENING FOR CARDIOVASCULAR CONDITION: ICD-10-CM

## 2025-07-09 LAB
CHOLEST SERPL-MCNC: 161 MG/DL (ref ?–200)
FASTING PATIENT LIPID ANSWER: YES
HDLC SERPL-MCNC: 53 MG/DL (ref 40–59)
LDLC SERPL CALC-MCNC: 87 MG/DL (ref ?–100)
NONHDLC SERPL-MCNC: 108 MG/DL (ref ?–130)
TRIGL SERPL-MCNC: 120 MG/DL (ref 30–149)
VLDLC SERPL CALC-MCNC: 19 MG/DL (ref 0–30)

## 2025-07-09 PROCEDURE — 80061 LIPID PANEL: CPT | Performed by: FAMILY MEDICINE

## 2025-07-09 PROCEDURE — 83695 ASSAY OF LIPOPROTEIN(A): CPT | Performed by: FAMILY MEDICINE

## 2025-07-10 LAB — LIPOPROTEIN (A): 122 NMOL/L

## (undated) DIAGNOSIS — Z12.11 ENCOUNTER FOR SCREENING COLONOSCOPY: Primary | ICD-10-CM

## (undated) NOTE — Clinical Note
01/12/2017          To Whom It May Concern,    Due to medical illness, please excuse Elizabeth Talley from work 1/9/17 through 1/17/17. May return to work 1/18/17.     Sincerely,    Amanda Knight D.O., FAAFP

## (undated) NOTE — MR AVS SNAPSHOT
[facility]  [address] [address]  [phone]               Thank you for choosing us for your health care visit with Nesha Miller DO.   We are glad to serve you and happy to provide you with this summ Call [phone] for help. [employer] is NOT to be used for urgent needs. For medical emergencies, dial 911.         Educational Information     Healthy Diet and Regular Exercise  [employer] for making healthy food choices  -

## (undated) NOTE — LETTER
01/08/19      [phone] [id number]          Dear Everardo Guido,    [address] [facility] records indicate that you are due for fasting blood work (lipid panel, CMP, PSA)   Please call our office during normal business hours so that we may schedule

## (undated) NOTE — MR AVS SNAPSHOT
[facility]  [address] [address]  [phone]               Thank you for choosing us for your health care visit with Ainsley Carias DO.   We are glad to serve you and happy to provide you with this summ - Amoxicillin-Pot Clavulanate 875-125 MG Tabs  - predniSONE 20 MG Tabs            MyChart     Visit MyChart  You can access your MyChart to more actively manage your health care and view more details from this visit by going to [email or web address]. or

## (undated) NOTE — LETTER
12/13/18        [id number]          Dear Millicent Eason,    [address] [facility] records indicate that you are due for fasting blood work (lipid panel, CMP, PSA)    Please call our office during normal business hours so that we may schedu